# Patient Record
Sex: FEMALE | Race: WHITE | NOT HISPANIC OR LATINO | Employment: FULL TIME | URBAN - METROPOLITAN AREA
[De-identification: names, ages, dates, MRNs, and addresses within clinical notes are randomized per-mention and may not be internally consistent; named-entity substitution may affect disease eponyms.]

---

## 2023-05-10 ENCOUNTER — HOSPITAL ENCOUNTER (OUTPATIENT)
Dept: DATA CONVERSION | Facility: HOSPITAL | Age: 63
End: 2023-05-10
Attending: ORTHOPAEDIC SURGERY | Admitting: ORTHOPAEDIC SURGERY
Payer: COMMERCIAL

## 2023-05-10 DIAGNOSIS — M18.11 UNILATERAL PRIMARY OSTEOARTHRITIS OF FIRST CARPOMETACARPAL JOINT, RIGHT HAND: ICD-10-CM

## 2023-05-10 DIAGNOSIS — E78.5 HYPERLIPIDEMIA, UNSPECIFIED: ICD-10-CM

## 2023-05-10 DIAGNOSIS — M65.311 TRIGGER THUMB, RIGHT THUMB: ICD-10-CM

## 2023-05-10 DIAGNOSIS — M19.031 PRIMARY OSTEOARTHRITIS, RIGHT WRIST: ICD-10-CM

## 2023-05-10 DIAGNOSIS — M24.541 CONTRACTURE, RIGHT HAND: ICD-10-CM

## 2023-07-18 LAB
ALANINE AMINOTRANSFERASE (SGPT) (U/L) IN SER/PLAS: 16 U/L (ref 7–45)
ALBUMIN (G/DL) IN SER/PLAS: 4.4 G/DL (ref 3.4–5)
ALKALINE PHOSPHATASE (U/L) IN SER/PLAS: 87 U/L (ref 33–136)
ANION GAP IN SER/PLAS: 12 MMOL/L (ref 10–20)
ASPARTATE AMINOTRANSFERASE (SGOT) (U/L) IN SER/PLAS: 18 U/L (ref 9–39)
BASOPHILS (10*3/UL) IN BLOOD BY AUTOMATED COUNT: 0.03 X10E9/L (ref 0–0.1)
BASOPHILS/100 LEUKOCYTES IN BLOOD BY AUTOMATED COUNT: 0.7 % (ref 0–2)
BILIRUBIN TOTAL (MG/DL) IN SER/PLAS: 0.6 MG/DL (ref 0–1.2)
CALCIDIOL (25 OH VITAMIN D3) (NG/ML) IN SER/PLAS: 62 NG/ML
CALCIUM (MG/DL) IN SER/PLAS: 9.8 MG/DL (ref 8.6–10.3)
CARBON DIOXIDE, TOTAL (MMOL/L) IN SER/PLAS: 30 MMOL/L (ref 21–32)
CHLORIDE (MMOL/L) IN SER/PLAS: 102 MMOL/L (ref 98–107)
CHOLESTEROL (MG/DL) IN SER/PLAS: 264 MG/DL (ref 0–199)
CHOLESTEROL IN HDL (MG/DL) IN SER/PLAS: 91.9 MG/DL
CHOLESTEROL/HDL RATIO: 2.9
COBALAMIN (VITAMIN B12) (PG/ML) IN SER/PLAS: 921 PG/ML (ref 211–911)
CREATININE (MG/DL) IN SER/PLAS: 0.86 MG/DL (ref 0.5–1.05)
EOSINOPHILS (10*3/UL) IN BLOOD BY AUTOMATED COUNT: 0.15 X10E9/L (ref 0–0.7)
EOSINOPHILS/100 LEUKOCYTES IN BLOOD BY AUTOMATED COUNT: 3.7 % (ref 0–6)
ERYTHROCYTE DISTRIBUTION WIDTH (RATIO) BY AUTOMATED COUNT: 14.7 % (ref 11.5–14.5)
ERYTHROCYTE MEAN CORPUSCULAR HEMOGLOBIN CONCENTRATION (G/DL) BY AUTOMATED: 30.9 G/DL (ref 32–36)
ERYTHROCYTE MEAN CORPUSCULAR VOLUME (FL) BY AUTOMATED COUNT: 91 FL (ref 80–100)
ERYTHROCYTES (10*6/UL) IN BLOOD BY AUTOMATED COUNT: 4.53 X10E12/L (ref 4–5.2)
GFR FEMALE: 77 ML/MIN/1.73M2
GLUCOSE (MG/DL) IN SER/PLAS: 89 MG/DL (ref 74–99)
HEMATOCRIT (%) IN BLOOD BY AUTOMATED COUNT: 41.4 % (ref 36–46)
HEMOGLOBIN (G/DL) IN BLOOD: 12.8 G/DL (ref 12–16)
IMMATURE GRANULOCYTES/100 LEUKOCYTES IN BLOOD BY AUTOMATED COUNT: 0.2 % (ref 0–0.9)
LDL: 157 MG/DL (ref 0–99)
LEUKOCYTES (10*3/UL) IN BLOOD BY AUTOMATED COUNT: 4 X10E9/L (ref 4.4–11.3)
LYMPHOCYTES (10*3/UL) IN BLOOD BY AUTOMATED COUNT: 1.17 X10E9/L (ref 1.2–4.8)
LYMPHOCYTES/100 LEUKOCYTES IN BLOOD BY AUTOMATED COUNT: 29 % (ref 13–44)
MONOCYTES (10*3/UL) IN BLOOD BY AUTOMATED COUNT: 0.38 X10E9/L (ref 0.1–1)
MONOCYTES/100 LEUKOCYTES IN BLOOD BY AUTOMATED COUNT: 9.4 % (ref 2–10)
NEUTROPHILS (10*3/UL) IN BLOOD BY AUTOMATED COUNT: 2.29 X10E9/L (ref 1.2–7.7)
NEUTROPHILS/100 LEUKOCYTES IN BLOOD BY AUTOMATED COUNT: 57 % (ref 40–80)
NRBC (PER 100 WBCS) BY AUTOMATED COUNT: 0 /100 WBC (ref 0–0)
PLATELETS (10*3/UL) IN BLOOD AUTOMATED COUNT: 235 X10E9/L (ref 150–450)
POTASSIUM (MMOL/L) IN SER/PLAS: 3.7 MMOL/L (ref 3.5–5.3)
PROTEIN TOTAL: 7.2 G/DL (ref 6.4–8.2)
SEDIMENTATION RATE, ERYTHROCYTE: 6 MM/H (ref 0–30)
SODIUM (MMOL/L) IN SER/PLAS: 140 MMOL/L (ref 136–145)
THYROTROPIN (MIU/L) IN SER/PLAS BY DETECTION LIMIT <= 0.05 MIU/L: 1.5 MIU/L (ref 0.44–3.98)
TRIGLYCERIDE (MG/DL) IN SER/PLAS: 75 MG/DL (ref 0–149)
UREA NITROGEN (MG/DL) IN SER/PLAS: 14 MG/DL (ref 6–23)
VLDL: 15 MG/DL (ref 0–40)

## 2023-08-18 ENCOUNTER — HOSPITAL ENCOUNTER (OUTPATIENT)
Dept: DATA CONVERSION | Facility: HOSPITAL | Age: 63
End: 2023-08-18
Attending: ANESTHESIOLOGY | Admitting: ANESTHESIOLOGY
Payer: COMMERCIAL

## 2023-08-18 DIAGNOSIS — M25.511 PAIN IN RIGHT SHOULDER: ICD-10-CM

## 2023-09-07 VITALS — HEIGHT: 68 IN | BODY MASS INDEX: 23.22 KG/M2 | WEIGHT: 153.22 LBS

## 2023-09-14 NOTE — H&P
History & Physical Reviewed:   I have reviewed the History and Physical dated:  17-Apr-2023   History and Physical reviewed and relevant findings noted. Patient examined to review pertinent physical  findings.: No significant changes   Home Medications Reviewed: no changes noted   Allergies Reviewed: no changes noted     Consent:   COVID-19 Consent:  ·  COVID-19 Risk Consent Surgeon has reviewed key risks related to the risk of marilu COVID-19 and if they contract COVID-19 what the risks are.       Electronic Signatures:  Maurilio Singh ()  (Signed 10-May-2023 10:57)   Authored: History & Physical Reviewed, Consent, Note  Completion      Last Updated: 10-May-2023 10:57 by Maurilio Singh ()

## 2023-09-29 VITALS — HEIGHT: 68 IN | BODY MASS INDEX: 22.79 KG/M2 | WEIGHT: 150.35 LBS

## 2023-10-02 NOTE — OP NOTE
Post Operative Note:     Post-Procedure Diagnosis: Right thumb CMC arthritis.   Right wrist STT arthritis.  Right trigger thumb.   Procedure: 1.   2.   3.   4.   5.   Surgeon: Maurilio Singh D.O.   Resident/Fellow/Other Assistant: URIAH Arita   Estimated Blood Loss (mL): Less than 10 cc   Specimen: no   Findings: Right thumb CMC arthritis.  Right wrist  STT arthritis.  Right trigger thumb.     Operative Report Dictated:  Dictation: not applicable - note contains Operative  Report   Operative Report:    Preoperative diagnosis: Right thumb CMC osteoarthritis.  Right hand first webspace contracture.Right trigger thumb.  Right wrist STT arthritis.    Postoperative diagnosis: Right thumb CMC osteoarthritis.  Right hand first webspace contracture.Right trigger thumb.  Right wrist STT arthritis.    Procedure planned: Right thumb CMC arthroplasty using Arthrex mini tight rope construct with possible tendon transfer.Right trigger thumb release.  Partial resection trapezoid for treatment of wrist STT arthritis.    Procedure performed: Right thumb CMC arthroplasty using Arthrex mini tight rope construct.  End to side tendon transfer of abductor pollicis longus to flexor carpi radialis for treatment of webspace contracture.Right trigger thumb release.  Partial  resection trapezoid for treatment right wrist STT arthritis.    Surgeon: Maurilio Singh D.O.    Assistant: URIAH Arita  The physician assistant was present to the entire case. Given the nature of the disease process and the procedure to be performed a skilled surgical assistant was necessary during the case. The assistant was necessary in order to hold retractors and directly  assist in the operation. A certified scrub tech was at the back table managing instruments and supplies for the surgical case.    Anesthesia: General    Estimated blood loss: Less than 20 mL    Drains: None    Tourniquet: Approximately 30 minutes at 250 mmHg to the well-padded  upper arm    Specimens: None    Implants: Arthrex mini tight rope with FiberWire suture and stainless steel endobuttons    Indications: The patient presented to the office with painful arthritis affecting right thumb CMC articulation.  The patient also had painful triggering of the right thumb and evidence for right wrist STT arthritis.  The patient described pain and dysfunction  on a daily basis.  The patient experienced failure of nonoperative treatment strategies.  After full discussion regarding risks benefits and alternatives the patient elected to proceed forth with surgery by way of right thumb CMC arthroplasty using Arthrex  mini tight rope construct possibly augmented with tendon transfer with trigger thumb release with partial resection trapezoid..  Informed consent was signed and placed in the chart.    Complications: None noted at the time of surgery    Description of operation: The patient was taken to the operative suite and placed in the supine position on the operating table.  A timeout was performed and the right hand was confirmed to be the operative site.  The patient was carefully positioned  on the table in such a fashion as to pad all bony prominences and peripheral nerves.  The patient was administered appropriate IV antibiotics and general anesthesia.  The patient was then prepped and draped in the normal sterile fashion.  After prepping  and draping an apex volar chevron shaped incision was marked out over the base of the thumb centered over the CMC articulation.  A 1 cm incision was marked out over the metaphyseal diaphyseal junction of the dorsal second metacarpal slightly ulnar to  midline.  A sterile Esmarch was then used to exsanguinate the upper extremity and tourniquet was raised to 250 mmHg. The 15 blade was then used to incise skin over the base of the thumb.  Tenotomy scissors were used to gently dissect down to the subcutaneous  plane, taking care to identify and protect the  dorsal sensory branches of the radial nerve.  The sensory branches were protected through the remainder of the case.  We then developed the interval between the extensor pollicis brevis and abductor pollicis  longus.  With the tendons retracted we created a full-thickness capsular incision down to bone directly overlying the CMC articulation.  Sharp dissection was used to release the soft tissue attachments to the trapezium.  An osteotome was then used to  quarter the trapezium and the trapezium fragments were then removed in a piecemeal fashion with the Rongeur.  Care was taken to avoid iatrogenic injury to the underlying FCR tendon and capsular structures.  The interface between the trapezoid and the  distal pole scaphoid was then inspected.  There was evidence for eburnated bone and cartilaginous destruction.  It was deemed most appropriate to resect the proximal portion of the trapezoid.  A 4 mm wafer was resected using osteotome and mallet and rongeur  eliminating the painful contact through this zone of wrist arthritis.   The guidepin from the Arthrex mini tight rope set was then placed about the radial base of the first metacarpal.  Under fluoroscopic imaging the pin was delivered from radial to ulnar  and proximal to distal obtaining bicortical purchase within the first metacarpal base and second metacarpal shaft.  Care was taken to pass the pin so that it would exit about the ulnar aspect of the second metacarpal shaft.  We then loaded the suture  for the definitive implant through the wire loop of the trailing edge of the guidepin.  The guidepin was then advanced along with the definitive suture construct through a small incision in the dorsal hand.  Dissection was carried down to the second metacarpal  shaft exposing a small window of periosteum over the dorsal ulnar aspect of the second metacarpal shaft.  The second stainless steel endo-button was then tied down over the ulnar aspect of the second  metacarpal shaft taking care to set the appropriate  tone within the construct.  Fluoroscopic imaging, dynamic assessment and direct inspection were used to set the appropriate tone within the construct avoiding overtightening or undertaking.  Despite the fact that the first metacarpal was positioned in  the appropriate posture upon tightening there was still evidence for adduction deformity of the metacarpal and webspace contracture.  It was deemed most appropriate to perform end to side transfer of abductor pollicis longus to flexor carpi radialis tendon  in order to impart a greater extension and abduction posture to the metacarpal which would lead to better function.  #2 Ethibond suture was used to create a suture suspension sling by passing suture to the FCR tendon just proximal to the point where it  passes volar to the second metacarpal and also through the abductor pollicis longus tendon at the point where it inserts at the radial aspect of the first metacarpal base, creating 2 passes through each tendon.  When tightened the suture suspension sling  improved the posture of the first metacarpal creating more abduction and extension copious irrigation was then performed.  The tourniquet was deflated.  Meticulous hemostasis was achieved.  Vital stitches were used to close the capsular layer over the  CMC articulation. Attention was then turned to the trigger thumb release.  A transverse incision was made in the MCP flexion crease to the right thumb.  Dissection was carried through the subcutaneous plane.  Neurovascular bundles were exposed both radially  and ulnarly.  These were protected.  The A1 pulley was exposed.  Combination of 15 blade and tenotomy scissors were used to release the A1 pulley in its midline.  Interrupted nylon stitches were used to close the skin.  The patient was then placed into  a nonadherent dressing and thumb spica short arm plaster splint.  The patient was then allowed to arise from  general anesthesia and taken to recovery in stable condition.  Overall the patient tolerated the procedure well.    Disposition: Stable to PACU        Electronic Signatures:  Maurilio Singh ()  (Signed 10-May-2023 12:31)   Authored: Post Operative Note, Note Completion      Last Updated: 10-May-2023 12:31 by Maurilio Singh ()

## 2023-10-03 ENCOUNTER — TREATMENT (OUTPATIENT)
Dept: OCCUPATIONAL THERAPY | Facility: CLINIC | Age: 63
End: 2023-10-03
Payer: COMMERCIAL

## 2023-10-03 DIAGNOSIS — M19.049 CMC ARTHRITIS: Primary | ICD-10-CM

## 2023-10-03 PROCEDURE — 97110 THERAPEUTIC EXERCISES: CPT | Mod: GO

## 2023-10-03 ASSESSMENT — PAIN SCALES - GENERAL: PAINLEVEL_OUTOF10: 5 - MODERATE PAIN

## 2023-10-03 ASSESSMENT — PAIN - FUNCTIONAL ASSESSMENT: PAIN_FUNCTIONAL_ASSESSMENT: 0-10

## 2023-10-03 NOTE — PROGRESS NOTES
Assessed    · CMC arthritis, thumb, degenerative (715.34) (M18.9)    Plan today.     Patient will report independence with returning to exercises and being able to hold grandchild., by week 10, goal partially met   ADL   Patient’s level of independence with ADLs/ IADLs will improve by at least 50% per the quick dash by discharge.       , by week 10, goal partially met   Orthosis: Patient will report follow through with wearing of orthosis as instructed by therapist.    , by week 10, goal partially met   HEP:   Patient will report daily follow with HEP recorded in daily treatment notes by discharge.        , by week 10, goal partially met   Scar:   Patient’s scar will be supple and demonstrate good healing that does not limit function.     , by week 5, goal met   Pain: Patient will report pain free use of hand for completing ADLs/IADLS.  , by week 5, goal met   Range of Motion/Joint Mobility:   Patient’s wrist ROM will be within normal limits for independence with ADLs/ IADLs by discharge.       , by week 10, goal partially met   Strength:    Patient's Gross Grasp strength (2nd setting) will be within 10 # of opposite hand for increased functional independence by discharge.       , by week 10   Motor Function/Control/Tone:   ROM, Patient will demonstrate functional  and pinch ROM in her digits., by week 10     Intervention plan include: hot pack , paraffin , edema control , education/instruction , home program , kinesiotaping , manual therapy , orthosis fabrication/fit training , therapeutic activities and therapeutic exercises.   Frequency and duration: 2 time(s) a week, for 5 weeks . Patient will benefit from 5 additional visits.   Potential to achieve rehab goals is good.     orthoses check .   Progress with POC, as tolerated.    Goals adjusted, see goals for details.    Monitor home program.    Patient instructed to call if problems.    Monitor orthotic wear and fit, adjust as needed.     Plan of care was  "developed with input and agreement by the patient.      Assessment      Patient was able to verbalized precautions and demonstrated/taught back good understanding of personalized exercise program as verbalized, demonstrated and/or provided in printed format.   Patient reporting decreased strength for  and pinch strength today.      Reason For Visit  Reason For Visit [facility]:   Continuation of therapy.      Adult Risk Screening    Initial Fall Risk Screening:   ALYCE has not fallen in the last 6 months. Her fall did not result in injury. ALYCE does not have a fear of falling. She does not need assistance with sitting, standing or walking. Does not need assistance walking in her home. She does not need assistance in an unfamiliar setting.   Pain Scale: On a scale of 0 to 10, the patient rates the pain at 5.   Pain Quality: aching.        Domestic Violence Screen: Does not feel threatened or abused physically, emotionally or sexually. Do you feel UNSAFE?yes   The patient feels safe in the home.        Insurance    Insurance reviewed   Visit number: 32/36        Subjective    Patient reports:.   Patient reports she is wearing her orthoses        Home program performing as directed: Yes.      Objective  No apparent edema noted right thumb.  Observation: (Patient tends to still keep thumb adducted and IP hyperextended)   Strength:   Hands   (Key: \" P! \" Denotes Pain with Movement)   Hand Dominance: Right    Strength: level II 13 lbs on the right and level II 43 lbs on the left.   The key pinch trials for the right 7 lbs and left 13 lbs.   3 Point Pinch: right 8 lbs and left 14 lbs.     Outcome Measures:   Quick Dash score: 75 last appointment      Treatment:  Gentle PROM completed to right thumb completed.  Patient completed stretching with Theraweb. She also completed  and pinch strengthening. Patient completed Theratube, weighted ball, and pinch clip.  Patient completed fine motor with short opponens " on.Reviewed home program.    Assessment: Patient tolerated all exercises well, but had difficulty completing fine motor with short opponens on to encourage thumb IP flexion. She is demonstrating decreased hand strength this date.    Plan: Continue with ROM and strengthening right thumb.

## 2023-10-06 PROBLEM — G56.11: Status: ACTIVE | Noted: 2023-10-06
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
## 2023-10-06 PROBLEM — M25.511 CHRONIC RIGHT SHOULDER PAIN: Status: ACTIVE | Noted: 2023-10-06

## 2023-10-06 PROBLEM — K59.09 CHRONIC CONSTIPATION: Status: ACTIVE | Noted: 2023-10-06

## 2023-10-06 PROBLEM — M19.019 ACROMIOCLAVICULAR JOINT ARTHRITIS: Status: ACTIVE | Noted: 2023-10-06

## 2023-10-06 PROBLEM — R25.1 TREMOR: Status: ACTIVE | Noted: 2023-10-06

## 2023-10-06 PROBLEM — M65.319 TRIGGER THUMB: Status: ACTIVE | Noted: 2023-10-06

## 2023-10-06 PROBLEM — I07.1 TRICUSPID REGURGITATION: Status: ACTIVE | Noted: 2023-10-06

## 2023-10-06 PROBLEM — R29.898 COGWHEEL RIGIDITY: Status: ACTIVE | Noted: 2023-10-06

## 2023-10-06 PROBLEM — E67.3 VITAMIN D INTOXICATION: Status: ACTIVE | Noted: 2023-10-06

## 2023-10-06 PROBLEM — R94.31 ABNORMAL ELECTROCARDIOGRAM (ECG) (EKG): Status: ACTIVE | Noted: 2023-10-06

## 2023-10-06 PROBLEM — M75.01 ADHESIVE CAPSULITIS OF RIGHT SHOULDER: Status: ACTIVE | Noted: 2023-10-06

## 2023-10-06 PROBLEM — E78.2 MIXED HYPERLIPIDEMIA: Status: ACTIVE | Noted: 2023-10-06

## 2023-10-06 PROBLEM — R01.1 MURMUR: Status: ACTIVE | Noted: 2023-10-06

## 2023-10-06 PROBLEM — R63.4 LOSS OF WEIGHT: Status: ACTIVE | Noted: 2023-10-06

## 2023-10-06 PROBLEM — E55.9 VITAMIN D DEFICIENCY: Status: ACTIVE | Noted: 2023-10-06

## 2023-10-06 PROBLEM — E66.3 OVERWEIGHT (BMI 25.0-29.9): Status: ACTIVE | Noted: 2023-10-06

## 2023-10-06 PROBLEM — G20.A1 PARKINSONS DISEASE (MULTI): Status: ACTIVE | Noted: 2023-10-06

## 2023-10-06 PROBLEM — N81.6 RECTOCELE: Status: ACTIVE | Noted: 2023-10-06

## 2023-10-06 PROBLEM — R94.30 ABNORMAL RESULTS OF CARDIOVASCULAR FUNCTION STUDIES: Status: ACTIVE | Noted: 2023-10-06

## 2023-10-06 PROBLEM — E28.39 HYPOESTROGENISM: Status: ACTIVE | Noted: 2023-10-06

## 2023-10-06 PROBLEM — M67.449 GANGLION OF HAND: Status: ACTIVE | Noted: 2023-10-06

## 2023-10-06 PROBLEM — R32 URINARY INCONTINENCE IN FEMALE: Status: ACTIVE | Noted: 2023-10-06

## 2023-10-06 PROBLEM — J31.0 CHRONIC RHINITIS: Status: ACTIVE | Noted: 2023-10-06

## 2023-10-06 PROBLEM — G89.29 CHRONIC RIGHT SHOULDER PAIN: Status: ACTIVE | Noted: 2023-10-06

## 2023-10-06 PROBLEM — M25.60 JOINT STIFFNESS: Status: ACTIVE | Noted: 2023-10-06

## 2023-10-06 RX ORDER — MONTELUKAST SODIUM 10 MG/1
1 TABLET ORAL DAILY
COMMUNITY

## 2023-10-06 RX ORDER — ACETAMINOPHEN 500 MG
1 TABLET ORAL DAILY
COMMUNITY
End: 2024-01-26 | Stop reason: WASHOUT

## 2023-10-06 RX ORDER — MULTIVIT,CALC,MINS/IRON/FOLIC 9MG-400MCG
1 TABLET ORAL DAILY
COMMUNITY
End: 2024-01-26 | Stop reason: WASHOUT

## 2023-10-06 RX ORDER — ZINC GLUCONATE 50 MG
1 TABLET ORAL DAILY
COMMUNITY

## 2023-10-06 RX ORDER — MAGNESIUM OXIDE/MAG AA CHELATE 300 MG
1 CAPSULE ORAL DAILY
COMMUNITY
End: 2024-01-26 | Stop reason: WASHOUT

## 2023-10-06 RX ORDER — HYDROCORTISONE 25 MG/G
OINTMENT TOPICAL 4 TIMES DAILY
COMMUNITY
End: 2024-01-26 | Stop reason: WASHOUT

## 2023-10-06 RX ORDER — MULTIVITAMIN WITH IRON
1 TABLET ORAL DAILY
COMMUNITY
End: 2024-01-26 | Stop reason: WASHOUT

## 2023-10-06 RX ORDER — ASCORBIC ACID 250 MG
1 TABLET ORAL DAILY
COMMUNITY
End: 2024-01-26 | Stop reason: WASHOUT

## 2023-10-06 RX ORDER — SODIUM, POTASSIUM,MAG SULFATES 17.5-3.13G
SOLUTION, RECONSTITUTED, ORAL ORAL
COMMUNITY

## 2023-10-06 RX ORDER — NAPROXEN 500 MG/1
1 TABLET ORAL 2 TIMES DAILY
COMMUNITY
Start: 2020-01-23 | End: 2024-01-26 | Stop reason: WASHOUT

## 2023-10-06 RX ORDER — SERTRALINE HYDROCHLORIDE 50 MG/1
50 TABLET, FILM COATED ORAL DAILY
COMMUNITY
Start: 2023-09-05 | End: 2024-01-26 | Stop reason: WASHOUT

## 2023-10-06 RX ORDER — LANOLIN ALCOHOL/MO/W.PET/CERES
1 CREAM (GRAM) TOPICAL DAILY
COMMUNITY
End: 2024-01-26 | Stop reason: WASHOUT

## 2023-10-06 RX ORDER — ALBUTEROL SULFATE 90 UG/1
1-2 AEROSOL, METERED RESPIRATORY (INHALATION)
COMMUNITY
End: 2024-01-26 | Stop reason: WASHOUT

## 2023-10-06 RX ORDER — UREA 200 MG/G
CREAM TOPICAL
COMMUNITY
Start: 2023-07-19 | End: 2024-01-26 | Stop reason: WASHOUT

## 2023-10-06 RX ORDER — ZINC SULFATE 50(220)MG
CAPSULE ORAL
COMMUNITY
End: 2024-01-26 | Stop reason: WASHOUT

## 2023-10-09 ENCOUNTER — OFFICE VISIT (OUTPATIENT)
Dept: ORTHOPEDIC SURGERY | Facility: CLINIC | Age: 63
End: 2023-10-09
Payer: COMMERCIAL

## 2023-10-09 DIAGNOSIS — M25.649 THUMB JOINT STIFFNESS: ICD-10-CM

## 2023-10-09 DIAGNOSIS — S56.019A RUPTURE OF FLEXOR POLLICIS LONGUS MUSCLE: ICD-10-CM

## 2023-10-09 DIAGNOSIS — M18.11 PRIMARY OSTEOARTHRITIS OF FIRST CARPOMETACARPAL JOINT OF RIGHT HAND: Primary | ICD-10-CM

## 2023-10-09 PROCEDURE — 99214 OFFICE O/P EST MOD 30 MIN: CPT | Performed by: ORTHOPAEDIC SURGERY

## 2023-10-09 PROCEDURE — 1036F TOBACCO NON-USER: CPT | Performed by: ORTHOPAEDIC SURGERY

## 2023-10-09 NOTE — LETTER
October 9, 2023     Patient: Beverly Sanon   YOB: 1960   Date of Visit: 10/9/2023       To Whom It May Concern:    It is my medical opinion that Beverly Sanon may return to work on 10/10/23 on a light duty status for the next 4 weeks.  At her next appointment, we will re-evaluate the work restrictions .    If you have any questions or concerns, please don't hesitate to call< [phone].         Sincerely,        Maurilio Singh, DO

## 2023-10-09 NOTE — PROGRESS NOTES
History present illness: Patient presents today for ongoing evaluation status post right thumb CMC arthroplasty using Arthrex mini tight rope construct with concomitant right trigger thumb release done 5/10/2023.  She continues to have difficulty with active flexion of the right thumb interphalangeal joint.  EMG nerve conduction study test showed no evidence for AIN dysfunction.        Physical examination:  General: Alert and oriented to person, place, and time.  No acute distress and breathing comfortably: Pleasant and cooperative with examination.  Extremities: Evaluation of the right upper extremity finds the patient had palpable radial artery at the wrist with brisk capillary refill to all digits.  Patient has intact sensation to axillary radial median and ulnar nerves.  There are no open wounds.  There are no signs of infection.  There is no evidence of lymphedema or lymphatic streaking.  The patient has supple compartments to right arm forearm and hand.  Patient demonstrates good strong distal and phalangeal joint flexion to index and long finger.  She is able to demonstrate active thumb IP flexion with passive thumb MCP extension and blocking at the level of P1.      Diagnostic studies:      Assessment: Status post right thumb CMC arthroplasty with weakness to thumb flexion      Plan: Treatment options were discussed.  EMG came back effectively as a normal study.  It is possible that she has some pattern of attritional rupture that could be identified on MRI.  We talked about options.  She is going to keep on with her massage therapy sessions and proceed forth with MRI of the wrist to follow the course of FPL tendon and get a look at the muscle belly as well.  We will see back when that study is completed to discuss findings and treatment options from there.  No x-rays necessary upon return.      Procedure:        Procedure:

## 2023-10-10 ENCOUNTER — TREATMENT (OUTPATIENT)
Dept: OCCUPATIONAL THERAPY | Facility: CLINIC | Age: 63
End: 2023-10-10
Payer: COMMERCIAL

## 2023-10-10 DIAGNOSIS — M19.049 CMC ARTHRITIS: Primary | ICD-10-CM

## 2023-10-10 PROCEDURE — 97110 THERAPEUTIC EXERCISES: CPT | Mod: GO

## 2023-10-10 ASSESSMENT — PAIN SCALES - GENERAL: PAINLEVEL_OUTOF10: 5 - MODERATE PAIN

## 2023-10-10 ASSESSMENT — PAIN - FUNCTIONAL ASSESSMENT: PAIN_FUNCTIONAL_ASSESSMENT: 0-10

## 2023-10-10 NOTE — PROGRESS NOTES
Diagnosis:   · CMC arthritis, thumb, degenerative (715.34) (M18.9)     Plan today.      Patient will report independence with returning to exercises and being able to hold grandchild., by week 10, Goal partially met   ADL   Patient’s level of independence with ADLs/ IADLs will improve by at least 50% per the quick dash by discharge.    by week 10, Goal Partially  Met    Orthosis: Patient will report follow through with wearing of orthosis as instructed by therapist by week 10, Goal Met    HEP:   Patient will report daily follow with HEP recorded in daily treatment notes by discharge by week 10, Goal  Met         Scar:   Patient’s scar will be supple and demonstrate good healing that does not limit function.  by week 5, Goal Met     Pain: Patient will report pain free use of hand for completing ADLs/IADLS. by week 5, Goal Met     Range of Motion/Joint Mobility:   Patient’s wrist ROM will be within normal limits for independence with ADLs/ IADLs by discharge. by week 10, goal partially met     Strength:    Patient's Gross Grasp strength (2nd setting) will be within 10 # of opposite hand for increased functional independence by discharge.     by week 10 Partially met    Motor Function/Control/Tone:   ROM, Patient will demonstrate functional  and pinch ROM in her digits., by week 10      Intervention plan include: hot pack , paraffin , edema control , education/instruction , home program , kinesiotaping , manual therapy , orthosis fabrication/fit training , therapeutic activities and therapeutic exercises.   Frequency and duration: 2 time(s) a week, for 5 weeks . Patient will benefit from 5 additional visits.   Potential to achieve rehab goals is good.      Patient discharged to home program  Patient instructed to call if problems.         Initial Plan of Care was developed with input and agreement by the patient.      Assessment   Patient has progressed nicely and is discharged to home program and is expected to  "make gains.     Patient was able to verbalized precautions and demonstrated/taught back good understanding of personalized exercise program as verbalized, demonstrated and/or provided in printed format.   Patient reporting decreased strength for  and pinch strength today.      Reason For Visit  Reason For Visit [facility]:   Continuation of therapy.      Adult Risk Screening     Initial Fall Risk Screening:   ALYCE has not fallen in the last 6 months. Her fall did not result in injury. ALYCE does not have a fear of falling. She does not need assistance with sitting, standing or walking. Does not need assistance walking in her home. She does not need assistance in an unfamiliar setting.   Pain Scale: On a scale of 0 to 10, the patient rates the pain at 5.   Pain Quality: sharp in right thumb       Domestic Violence Screen: Does not feel threatened or abused physically, emotionally or sexually. Do you feel UNSAFE?yes   The patient feels safe in the home.        Insurance     Insurance reviewed   Visit number: 33/36        Subjective     Patient reports:.   Patient reports she is ready for discharge to home program.          Home program performing as directed: Yes.      Objective  No apparent edema noted right thumb.  Observation: (Patient tends to still keep thumb adducted and IP hyperextended)    AROM  THUMB  MP 0/45  IP -50/15  Thumb opposition 7  Thumb radial abduction 50  Thumb bryant abduction 60    Composite flexion right hand to distal bryant crease (DPC)  Index 1.5  Middle 0.5  Ring touching palm  Small touching palm    Strength:   Hands   (Key: \" P! \" Denotes Pain with Movement)   Hand Dominance: Right    Strength: level II 13 lbs on the right and level II 45 lbs on the left.   The key pinch trials for the right 8 lbs and left 12 lbs.   3 Point Pinch: right 8 lbs and left 14 lbs.      Outcome Measures:   Quick Dash score: 52.27      Treatment:   Re-evaluation completed and discussed findings with " patient. Paraffin with stretch to digits and thumb completed. PROM to right thumb completed. Patient completed stretching with Theraweb, Theratube, weighted ball,  2# weight, fine motor with grooved pegboard, and pinch clip. Reviewed home program for discharge.     Assessment:  Over all patient has made gains, but still has decreased hand strength and decreased ROM.     Plan: Patient discharged. She will Continue with ROM and strengthening right thumb for home program.

## 2023-10-23 ENCOUNTER — ANCILLARY PROCEDURE (OUTPATIENT)
Dept: RADIOLOGY | Facility: CLINIC | Age: 63
End: 2023-10-23
Payer: COMMERCIAL

## 2023-10-23 DIAGNOSIS — S56.019A RUPTURE OF FLEXOR POLLICIS LONGUS MUSCLE: ICD-10-CM

## 2023-10-23 PROCEDURE — 73218 MRI UPPER EXTREMITY W/O DYE: CPT | Mod: RT

## 2023-10-23 PROCEDURE — 73218 MRI UPPER EXTREMITY W/O DYE: CPT | Mod: RIGHT SIDE | Performed by: RADIOLOGY

## 2023-10-26 ENCOUNTER — OFFICE VISIT (OUTPATIENT)
Dept: ORTHOPEDIC SURGERY | Facility: CLINIC | Age: 63
End: 2023-10-26
Payer: COMMERCIAL

## 2023-10-26 DIAGNOSIS — M19.049 CMC ARTHRITIS: Primary | ICD-10-CM

## 2023-10-26 PROCEDURE — 99214 OFFICE O/P EST MOD 30 MIN: CPT | Performed by: ORTHOPAEDIC SURGERY

## 2023-10-26 PROCEDURE — 1036F TOBACCO NON-USER: CPT | Performed by: ORTHOPAEDIC SURGERY

## 2023-10-26 PROCEDURE — 20600 DRAIN/INJ JOINT/BURSA W/O US: CPT | Performed by: ORTHOPAEDIC SURGERY

## 2023-10-26 RX ORDER — LIDOCAINE HYDROCHLORIDE 10 MG/ML
0.5 INJECTION INFILTRATION; PERINEURAL ONCE
Status: COMPLETED | OUTPATIENT
Start: 2023-10-26 | End: 2024-05-17

## 2023-10-26 NOTE — LETTER
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   She is making an appointment with the neurologist and will follow up with my office after that.  Her work restrictions will be re-evaluated at that time.    If you have any questions or concerns, please don't hesitate to call, [phone].         Sincerely,        Maurilio Singh, DO

## 2023-10-26 NOTE — PROGRESS NOTES
History present illness: Patient presents today for ongoing follow-up status post right thumb CMC arthroplasty done in May 2023.  She has had difficulties with motion recovery.  The patient has a baseline of Parkinson's disease.  She describes pain down to the base of the left thumb.  She had an EMG nerve conduction study test that came back within normal limits.  We got that study specifically to look for AIN palsy but that was not recognized.  Postoperatively she has had a tendency towards a hyperextension posture of the thumb and has difficulty recruiting the thumb into IP joint flexion.      Past medical history: The patient's past medical history, family history, social history, and review of systems were documented on the patient medical intake.  The updated data was reviewed in the electronic medical record.  History is negative except otherwise stated in history of present illness.        Physical examination:  General: Alert and oriented to person, place, and time.  No acute distress and breathing comfortably: Pleasant and cooperative with examination.  HEENT: Head is normocephalic and atraumatic.  Neck: Supple, no visible swelling.  Cardiovascular: No palpable tachycardia  Lungs: No audible wheezing or labored breathing  Abdomen: Nondistended.  Extremities: Evaluation of the bilateral upper extremities finds the patient had palpable radial artery at the wrists with brisk capillary refill to all digits.  Patient has intact sensation to axillary radial median and ulnar nerves.  There are no open wounds.  There are no signs of infection.  There is no evidence of lymphedema or lymphatic streaking.  The patient has supple compartments to bilateral arm forearm and hand.  Patient shows tenderness to left thumb CMC joint with positive basilar grind test.  The right hand demonstrates generalized intrinsic atrophy.  There is hyperextension posture to the right thumb that is difficult to passively correct.  The EPL  tendon seems to have excessive tone.  There is cogwheel rigidity to passive flexion extension forces across the right wrist.  Positive Tinel's over course of ulnar nerve to right elbow.  Patient was observed to walk up and down the hallway with no evidence for shuffling gait.      Radiology:      Assessment: Status post right thumb CMC arthroplasty with complex clinical picture of cogwheel rigidity intrinsic atrophy and hyperextension posture to the right thumb of unclear etiology.  Left thumb CMC arthritis.  Parkinson's disease with cogwheel rigidity affecting the right upper extremity.      Plan: Treatment options were discussed.  On the left she elects for intra-articular steroid injection to CMC joint.  On the right the patient is going to observe things and make an appointment to see her neurologist through the [facility] to see if the Parkinson's is complicating the right upper extremity.  At this point I cannot find a discrete etiology specific to peripheral nerve compression or other hand related pathology that would explain the entire circumstance.  We will see back after evaluation by the neuro team, hopefully they can help.        Procedure:  Left Thumb Carpometacarpal Joint Injection: It was explained to the patient that the risks of a steroid injection include but are not limited to infection, local skin irritation, skin atrophy, calcification, continued pain and discomfort, elevated blood sugar, burning, failure to relieve pain, and possible late infection. The patient verbalized good insight and verbalized consent for the injection. It was further explained that the postinjection discomfort can be alleviated with additional medications, ice, elevation, and rest over the first 24 hours, and that these modalities are recommended.    Using aseptic technique, a solution containing 0.5 cc of 5 mg of Kenalog and 0.5 mL of 1% lidocaine without epinephrine was injected intraarticularly to the left thumb  carpometacarpal joint. Digital palpation was used to localize the CMC articulation about the dorsal radial aspect of the joint. Gentle traction was then imparted to the thumb distally and a 25-gauge needle was advanced through the skin, subcutaneous tissue and capsule. The injection was then administered and the patient tolerated the injection well. A band-aid was then placed. It should be noted that ethyl chloride spray was used to make the injection delivery more comfortable for the patient.

## 2023-11-06 ENCOUNTER — TELEPHONE (OUTPATIENT)
Dept: PRIMARY CARE | Facility: CLINIC | Age: 63
End: 2023-11-06
Payer: COMMERCIAL

## 2023-11-06 RX ORDER — ESCITALOPRAM OXALATE 5 MG/1
5 TABLET ORAL DAILY
COMMUNITY
End: 2024-05-16 | Stop reason: WASHOUT

## 2023-11-06 RX ORDER — CARBIDOPA AND LEVODOPA 25; 100 MG/1; MG/1
1 TABLET, ORALLY DISINTEGRATING ORAL 4 TIMES DAILY
COMMUNITY

## 2023-11-20 ENCOUNTER — OFFICE VISIT (OUTPATIENT)
Dept: ORTHOPEDIC SURGERY | Facility: CLINIC | Age: 63
End: 2023-11-20
Payer: COMMERCIAL

## 2023-11-20 DIAGNOSIS — M19.049 HAND ARTHRITIS: Primary | ICD-10-CM

## 2023-11-20 PROCEDURE — 1036F TOBACCO NON-USER: CPT | Performed by: ORTHOPAEDIC SURGERY

## 2023-11-20 PROCEDURE — 99213 OFFICE O/P EST LOW 20 MIN: CPT | Performed by: ORTHOPAEDIC SURGERY

## 2023-11-20 NOTE — PROGRESS NOTES
History present illness: Patient presents today for ongoing evaluation status post right thumb CMC arthroplasty.  She has a baseline of Parkinson's.  She was recently started on medication for her Parkinson's.  That was about 2 weeks ago.  So far no changes.  She continues to have issues with the posture of the right thumb.  She describes dissatisfaction with the contracted first webspace and the persistent IP hyperextension.        Physical examination:  General: Alert and oriented to person, place, and time.  No acute distress and breathing comfortably: Pleasant and cooperative with examination.  Extremities: Evaluation of the right upper extremity finds the patient had palpable radial artery at the wrist with brisk capillary refill to all digits.  Patient has intact sensation to axillary radial median and ulnar nerves.  There are no open wounds.  There are no signs of infection.  There is no evidence of lymphedema or lymphatic streaking.  The patient has supple compartments to right arm forearm and hand.  First webspace contracture right thumb with hyperextension posture of the interphalangeal joint that only corrects to about 30 degrees flexion with passive motion indicating some degree of extension contracture.  Actively the FPL function is intact but only minimal and the MCP must be forced into hyper extension to allow for only a small amount of active IP flexion.      Diagnostic studies:      Assessment: Status post right thumb CMC arthroplasty with postural changes to the thumb of unclear etiology likely multifactorial coming from the baseline of arthritic change and possible with a component of hypertonicity related to Parkinson's      Plan: Treatment options were discussed.  Recommendations are made for simple observation.  The patient is trying various splinting techniques through therapy.  We will see back in 3 months for repeat clinical evaluation.  No x-rays necessary upon  return.      Procedure:        Procedure:

## 2023-11-20 NOTE — LETTER
November 20, 2023     Patient: Beverly Sanon   YOB: 1960   Date of Visit: 11/20/2023       To Whom It May Concern:    It is my medical opinion that Beverly Sanon may return to work on 11/27/23 with restrictions.  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  These restrictions will remain in place for the next three months, at which that time she has a follow up appointment with myself to reassess these restrictions .    If you have any questions or concerns, please don't hesitate to call, [phone].         Sincerely,        Maurilio Singh, DO

## 2023-11-27 ENCOUNTER — APPOINTMENT (OUTPATIENT)
Dept: ORTHOPEDIC SURGERY | Facility: CLINIC | Age: 63
End: 2023-11-27
Payer: COMMERCIAL

## 2023-12-06 NOTE — PROGRESS NOTES
Rheumatology Outpatient Follow Up Note    Subjective   Beverly Sanon is a 63 y.o. female presenting today for No chief complaint on file..    History of Presenting Problem:   Rheum history:  She has pain in her hands since 8/2020. She has left thumb trigger finger s/p cortisone injection which really helped. She declined surgery at that time and has seen multiple orthopedic specialists. she also has right thumb pain. She was told she has arthritis and needs surgery which she declined. She received her 2nd cortisone injection in 1/2023 in her right CMC and she was told she has a lot of scarring in her joint and couldn't inject the medicine.      Interval history: she is undergoing PT for the frozen right shoulder which is slightly improving. He thumb pain has improved but she has limited thumb ROM now.     Past Medical History:   Past Medical History:   Diagnosis Date    Encounter for supervision of normal pregnancy, unspecified, unspecified trimester     Number of pregnancies, currently pregnant    Personal history of pneumonia (recurrent)     History of pneumonia       Allergies:   Allergies   Allergen Reactions    Red Yeast Rice Swelling, Hives and Itching       Medications:   Current Outpatient Medications:     acetaminophen (TYLENOL ORAL), Take 1-2 tablets by mouth every 6 hours if needed., Disp: , Rfl:     albuterol (Ventolin HFA) 90 mcg/actuation inhaler, Inhale 1-2 puffs. EVERY 4-6 HOURS AS NEEDED AND AS DIRECTED., Disp: , Rfl:     ascorbic acid (Vitamin C) 250 mg tablet, Take 1 tablet (250 mg) by mouth once daily., Disp: , Rfl:     CALCIUM ORAL, Take 1 tablet by mouth once daily., Disp: , Rfl:     carbidopa-levodopa (Parcopa)  mg disintegrating tablet, Take 1 tablet by mouth 3 times a day., Disp: , Rfl:     cholecalciferol (Vitamin D3) 50 mcg (2,000 unit) capsule, Take 1 capsule (50 mcg) by mouth once daily., Disp: , Rfl:     cyanocobalamin (Vitamin B-12) 1,000 mcg tablet, Take 1 tablet (1,000 mcg) by  mouth once daily., Disp: , Rfl:     docosahexaenoic acid/epa (FISH OIL ORAL), Take 3 capsules by mouth once daily., Disp: , Rfl:     escitalopram (Lexapro) 5 mg tablet, Take 1 tablet (5 mg) by mouth once daily., Disp: , Rfl:     hydrocortisone 2.5 % ointment, 4 times a day.  APPLY GENTLY TO AFFECTED AREA, Disp: , Rfl:     lysine 600 mg tablet, Take 1 tablet (600 mg) by mouth once daily., Disp: , Rfl:     magnesium oxide 500 mg capsule, Take 1 capsule (500 mg) by mouth once daily., Disp: , Rfl:     magnesium oxide-Mg AA chelate (Magnesium, oxide/AA chelate,) 300 mg capsule, Take 1 capsule (300 mg) by mouth once daily., Disp: , Rfl:     montelukast (Singulair) 10 mg tablet, Take 1 tablet (10 mg) by mouth once daily., Disp: , Rfl:     multivitamin (Multiple Vitamins) tablet, Take 1 tablet by mouth once daily., Disp: , Rfl:     naproxen (Naprosyn) 500 mg tablet, Take 1 tablet (500 mg) by mouth twice a day., Disp: , Rfl:     sertraline (Zoloft) 50 mg tablet, Take 1 tablet (50 mg) by mouth once daily., Disp: , Rfl:     sodium,potassium,mag sulfates (Suprep) 17.5-3.13-1.6 gram recon soln solution, Take by mouth. Use as directed, Disp: , Rfl:     urea (Carmol) 20 % cream, Apply to the affected area on the right toe twice daily, Disp: , Rfl:     zinc sulfate (Zincate) 220 (50 Zn) MG capsule, Take by mouth., Disp: , Rfl:     Current Facility-Administered Medications:     lidocaine (Xylocaine) 10 mg/mL (1 %) injection 0.5 mL, 0.5 mL, injection, Once, Maurilio Singh DO    triamcinolone acetonide (Kenalog) injection 5 mg, 5 mg, intra-articular, Once, Maurilio Singh DO    Review of Systems:   Constitutional: Denies fever, chills   Eyes: Denies dry eyes, pain in the eyes   ENT: Denies dry mouth, loss of taste, sores in the mouth  Cardiovascular: Denies chest pain, palpitations   Respiratory: Denies shortness of breath   Gastrointestinal: Denies heartburn   Integumentary: Denies photosensitivity, rash or lesions, Raynaud's  "  Neurological: Denies any numbness or tingling    MSK: As per HPI.     All 10 review of systems have been reviewed and are negative for complaint except as noted in the HPI    Objective   Physical Examination:  There were no vitals filed for this visit.  Growth %ile SmartLinks can only be used for patients less than 20 years old.  Ht Readings from Last 1 Encounters:   10/23/23 1.727 m (5' 8\")     Wt Readings from Last 1 Encounters:   10/23/23 68 kg (150 lb)       General - NAD, sitting up in chair, well-groomed, pleasant, AAOx3  Head: Normocephalic, atraumatic  Eyes - PERRLA, EOMI. No conjunctiva injection.   Mouth/ENT - Moist oral and nasal mucosa. No facial rash.   Cardiovascular - Normal S1, S2. Regular rate and rhythm. No murmurs or rubs.  Lungs - Symmetric chest expansion. Clear to auscultation bilaterally.   Skin - No rashes or ulcers. Skin warm and dry. No erythema on bilateral cheeks.  Extremities - No edema, cyanosis ,or clubbing  Neurological - Alert and oriented x 3,  grossly intact. No focal deficit.  Musculoskeletal -  Shoulders: Full ROM, without pain, no swelling, warmth or tenderness.  Elbows: Full ROM, without pain, no swelling, warmth or tenderness.  Wrists: Full ROM, without pain, no swelling, warmth or tenderness.  MCP: No swelling, warmth or tenderness. Metacarpal squeeze negative  PIP: No swelling, warmth or tenderness.  DIP: No swelling, warmth or tenderness.  Hands : 5/5.    Sacroiliac joints: No local tenderness. LLOYD negative.   Hips: Full ROM.  No malalignment.  Knees:  Full ROM, without pain, no swelling, warmth or point tenderness.   Ankles: Full ROM, without pain, swelling, warmth or tenderness.  Toes:  Metatarsal squeeze negative  Cervical spine: No tenderness or limitation of movement  Lumbar spine: No tenderness or limitation of movement        Laboratory Testing:  {[facility] PED Lab Results List:68843}  ***    Imaging:  ***    Assessment/Plan   Beverly Sanon is a 63 y.o. female with " PMHx of Parkinsons disease, HLP and vitamin D deficiency who is presenting today for follow up:     ##CMC arthritis:She has recurrent pain in her hands worse at the thumb bases and now R>L. She failed cortisone injection of the R thumb due to excessive scarring.Labs from 2/2023: ESR/CRP/RP/anti CCP all negative. Hand/wrist xrays are consistent with OA mostly at 1st CMC. I have low suspicion for autoimmune joint disease like RA. She was referred to hand OT and s/p hand surgery in 5/2023 and is getting better     ##Right shoulder pain/adhesive capsulitis:  -Continue PT  -Shoulder MRI reviewed: moderate AC arthritis, intact rotator cuff  -Referred to pain management***        The assessment and plan, risk and benefits were discussed with the patient. All of the patients questions were answered and patient agrees to the plan.      Werner Newman MD  Clinical   Department of Rheumatology   [facility]

## 2023-12-08 ENCOUNTER — APPOINTMENT (OUTPATIENT)
Dept: GASTROENTEROLOGY | Facility: EXTERNAL LOCATION | Age: 63
End: 2023-12-08
Payer: COMMERCIAL

## 2023-12-11 ENCOUNTER — APPOINTMENT (OUTPATIENT)
Dept: RHEUMATOLOGY | Facility: CLINIC | Age: 63
End: 2023-12-11
Payer: COMMERCIAL

## 2023-12-15 ENCOUNTER — APPOINTMENT (OUTPATIENT)
Dept: GASTROENTEROLOGY | Facility: EXTERNAL LOCATION | Age: 63
End: 2023-12-15
Payer: COMMERCIAL

## 2023-12-28 ENCOUNTER — APPOINTMENT (OUTPATIENT)
Dept: PRIMARY CARE | Facility: CLINIC | Age: 63
End: 2023-12-28
Payer: COMMERCIAL

## 2024-01-15 ENCOUNTER — APPOINTMENT (OUTPATIENT)
Dept: GASTROENTEROLOGY | Facility: EXTERNAL LOCATION | Age: 64
End: 2024-01-15
Payer: COMMERCIAL

## 2024-01-22 ENCOUNTER — CLINICAL SUPPORT (OUTPATIENT)
Dept: ORTHOPEDIC SURGERY | Facility: CLINIC | Age: 64
End: 2024-01-22
Payer: COMMERCIAL

## 2024-01-22 ENCOUNTER — OFFICE VISIT (OUTPATIENT)
Dept: ORTHOPEDIC SURGERY | Facility: CLINIC | Age: 64
End: 2024-01-22
Payer: COMMERCIAL

## 2024-01-22 DIAGNOSIS — M19.049 CMC ARTHRITIS: ICD-10-CM

## 2024-01-22 PROCEDURE — 1036F TOBACCO NON-USER: CPT | Performed by: ORTHOPAEDIC SURGERY

## 2024-01-22 PROCEDURE — 73140 X-RAY EXAM OF FINGER(S): CPT | Mod: RIGHT SIDE | Performed by: ORTHOPAEDIC SURGERY

## 2024-01-22 PROCEDURE — 99213 OFFICE O/P EST LOW 20 MIN: CPT | Performed by: ORTHOPAEDIC SURGERY

## 2024-01-22 NOTE — LETTER
January 22, 2024     Patient: Beverly Sanon   YOB: 1960   Date of Visit: 1/22/2024       To Whom It May Concern:    It is my medical opinion that Beverly Sanon  is cleared to return to work in a modified duty status.  Beverly continues to experience difficulties with the hands postoperatively and in regards to her Parkinson's syndrome.  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    We recommend that this modified duty status of the shop duties only be extended for the next 4 months.    If you have any questions or concerns, please don't hesitate to call.         Sincerely,        Maurilio Singh,     CC: No Recipients

## 2024-01-22 NOTE — PROGRESS NOTES
History present illness: Patient presents today for ongoing evaluation of the right thumb.  She is status post right thumb CMC arthroplasty.  She had difficulty with postural change to the right thumb and difficulties with active thumb IP flexion.  She feels the thumb IP flexion is slightly improved.  Overall she is having more difficulties with function to the right hand globally secondary to the Parkinson's disease.  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  She also states that she is developing stiffness that seems to be complicating her Parkinson's sitting in a sedentary position for close to 6 hours a day.        Physical examination:  General: Alert and oriented to person, place, and time.  No acute distress and breathing comfortably: Pleasant and cooperative with examination.  Extremities: Evaluation of the right upper extremity finds the patient had palpable radial artery at the wrist with brisk capillary refill to all digits.  Patient has intact sensation to axillary radial median and ulnar nerves.  There are no open wounds.  There are no signs of infection.  There is no evidence of lymphedema or lymphatic streaking.  The patient has supple compartments to right arm forearm and hand.  Patient does show slightly improved right thumb IP flexion actively.  Generally speaking the right hand is stiff and seems to have difficulty with willful motion in terms of digital flexion and extension.  She does recruit index long ring and small fingers into a partial fist but it takes effort and is slow and somewhat intermittently rigid, cogwheel, with active flexion extension arc.      Diagnostic studies:      Assessment: Parkinson's disease affecting right hand.  Status post right thumb CMC arthroplasty.      Plan: Treatment options were discussed.  I think ultimately she needs duty modification in the workplace that would get her off the  "bus.  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  Being in a sedentary position in any form is back from her Parkinson's.  She needs to be mobile through the day and the safe way in the shop.  She understands her options.  She can follow-up with me on an as-needed basis for the right or left hand.      Procedure:        Procedure:    "

## 2024-01-23 ENCOUNTER — APPOINTMENT (OUTPATIENT)
Dept: ORTHOPEDIC SURGERY | Facility: CLINIC | Age: 64
End: 2024-01-23
Payer: COMMERCIAL

## 2024-01-24 ASSESSMENT — PROMIS GLOBAL HEALTH SCALE
CARRYOUT_SOCIAL_ACTIVITIES: VERY GOOD
RATE_GENERAL_HEALTH: GOOD
RATE_AVERAGE_FATIGUE: MILD
RATE_SOCIAL_SATISFACTION: GOOD
RATE_QUALITY_OF_LIFE: GOOD
RATE_PHYSICAL_HEALTH: GOOD
RATE_AVERAGE_PAIN: 2
RATE_MENTAL_HEALTH: VERY GOOD
CARRYOUT_PHYSICAL_ACTIVITIES: MOSTLY
EMOTIONAL_PROBLEMS: RARELY

## 2024-01-26 ENCOUNTER — OFFICE VISIT (OUTPATIENT)
Dept: PRIMARY CARE | Facility: CLINIC | Age: 64
End: 2024-01-26
Payer: COMMERCIAL

## 2024-01-26 VITALS
DIASTOLIC BLOOD PRESSURE: 81 MMHG | BODY MASS INDEX: 22.64 KG/M2 | SYSTOLIC BLOOD PRESSURE: 125 MMHG | WEIGHT: 149.36 LBS | HEART RATE: 80 BPM | HEIGHT: 68 IN

## 2024-01-26 DIAGNOSIS — R29.898 COGWHEEL RIGIDITY: ICD-10-CM

## 2024-01-26 DIAGNOSIS — M75.01 ADHESIVE CAPSULITIS OF RIGHT SHOULDER: ICD-10-CM

## 2024-01-26 DIAGNOSIS — R53.83 OTHER FATIGUE: ICD-10-CM

## 2024-01-26 DIAGNOSIS — G20.A1 PARKINSON'S DISEASE WITHOUT DYSKINESIA, UNSPECIFIED WHETHER MANIFESTATIONS FLUCTUATE (MULTI): ICD-10-CM

## 2024-01-26 DIAGNOSIS — E78.2 MIXED HYPERLIPIDEMIA: ICD-10-CM

## 2024-01-26 DIAGNOSIS — M19.049 CMC ARTHRITIS: Primary | ICD-10-CM

## 2024-01-26 DIAGNOSIS — M25.60 JOINT STIFFNESS: ICD-10-CM

## 2024-01-26 PROBLEM — M79.671 RIGHT FOOT PAIN: Status: ACTIVE | Noted: 2023-07-26

## 2024-01-26 PROBLEM — S56.019A: Status: ACTIVE | Noted: 2024-01-26

## 2024-01-26 PROBLEM — M25.471 EDEMA OF RIGHT ANKLE: Status: ACTIVE | Noted: 2023-07-26

## 2024-01-26 PROBLEM — G89.18 POSTOPERATIVE PAIN: Status: ACTIVE | Noted: 2024-01-26

## 2024-01-26 PROBLEM — R60.0 EDEMA OF RIGHT FOOT: Status: ACTIVE | Noted: 2023-07-26

## 2024-01-26 PROCEDURE — 1036F TOBACCO NON-USER: CPT | Performed by: INTERNAL MEDICINE

## 2024-01-26 PROCEDURE — 99214 OFFICE O/P EST MOD 30 MIN: CPT | Performed by: INTERNAL MEDICINE

## 2024-01-26 ASSESSMENT — PATIENT HEALTH QUESTIONNAIRE - PHQ9
2. FEELING DOWN, DEPRESSED OR HOPELESS: NOT AT ALL
1. LITTLE INTEREST OR PLEASURE IN DOING THINGS: NOT AT ALL
SUM OF ALL RESPONSES TO PHQ9 QUESTIONS 1 AND 2: 0

## 2024-01-26 NOTE — PROGRESS NOTES
Assessment/Plan   Problem List Items Addressed This Visit       CMC arthritis - Primary    Mixed hyperlipidemia    Parkinsons disease    Adhesive capsulitis of right shoulder    Cogwheel rigidity    Joint stiffness    Fatigue   No change in the treatment plan  Labs has been ordered by the [facility] physicians I will review once it is available which include CBC CMP and lipid profile  Following informed 4 months time  She was supposed to have a colonoscopy examination which was rescheduled and naturally that is also important for preventive purposes    Subjective   Patient ID: Beverly Sanon is a 64 y.o. female who presents for Annual Exam.    Past Surgical History:   Procedure Laterality Date    APPENDECTOMY  2014    Appendectomy    HAND SURGERY Right 2023    OTHER SURGICAL HISTORY  2021    Colonoscopy    OTHER SURGICAL HISTORY  2021    Rectal Surgery    TUBAL LIGATION  2014    Tubal Ligation      Family History   Problem Relation Name Age of Onset    Atrial fibrillation Mother Dian     Dementia Mother Dian     Breast cancer Mother Dian     Osteoporosis Mother Dian     Other (cardiac disorder) Father      Hyperlipidemia Father      Other (PTCA) Father      Skin cancer Brother      Heart attack Paternal Grandfather        Social History     Socioeconomic History    Marital status:      Spouse name: Not on file    Number of children: Not on file    Years of education: Not on file    Highest education level: Not on file   Occupational History    Not on file   Tobacco Use    Smoking status: Former     Packs/day: 0.25     Years: 1.00     Additional pack years: 0.00     Total pack years: 0.25     Types: Cigarettes     Quit date:      Years since quittin.0    Smokeless tobacco: Never   Substance and Sexual Activity    Alcohol use: Yes     Comment: Occasionally    Drug use: Never    Sexual activity: Never   Other Topics Concern    Not on file   Social History  "Narrative    Not on file     Social Determinants of Health     Financial Resource Strain: Not on file   Food Insecurity: Not on file   Transportation Needs: Not on file   Physical Activity: Not on file   Stress: Not on file   Social Connections: Not on file   Intimate Partner Violence: Not on file   Housing Stability: Not on file      Red yeast rice   Current Outpatient Medications   Medication Sig Dispense Refill    acetaminophen (TYLENOL ORAL) Take 1-2 tablets by mouth every 6 hours if needed.      carbidopa-levodopa (Parcopa)  mg disintegrating tablet Take 1 tablet by mouth 3 times a day.      escitalopram (Lexapro) 5 mg tablet Take 1 tablet (5 mg) by mouth once daily.      magnesium oxide 500 mg capsule Take 1 capsule (500 mg) by mouth once daily.      montelukast (Singulair) 10 mg tablet Take 1 tablet (10 mg) by mouth once daily.      sodium,potassium,mag sulfates (Suprep) 17.5-3.13-1.6 gram recon soln solution Take by mouth. Use as directed       Current Facility-Administered Medications   Medication Dose Route Frequency Provider Last Rate Last Admin    lidocaine (Xylocaine) 10 mg/mL (1 %) injection 0.5 mL  0.5 mL injection Once Maurilio Singh DO        triamcinolone acetonide (Kenalog) injection 5 mg  5 mg intra-articular Once Maurilio Singh DO          Vitals:    01/26/24 0819   BP: 125/81   BP Location: Left arm   Patient Position: Sitting   Pulse: 80   Weight: 67.7 kg (149 lb 5.8 oz)   Height: 1.727 m (5' 8\")      Problem List Items Addressed This Visit       CMC arthritis - Primary    Mixed hyperlipidemia    Parkinsons disease    Adhesive capsulitis of right shoulder    Cogwheel rigidity    Joint stiffness    Fatigue      No orders of the defined types were placed in this encounter.       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 " "past sometimes and helping in a bassinet  She may be looking for FPC soon  She continues to be followed in the clinic system for her Parkinson's disease and also rheumatology in the  system I have reviewed those charts  Her CMC arthroplasty with interdigital web fibrosis that has been surgically operated continue to be treated with therapeutic intervention and now she is little better but he still has support for that  She also has some problems in the bunion on the foot on the right side and has been dealt by the podiatrist and does give some discomfort but there has not been any pain  She also has adhesive capsulitis of the right shoulder and therapy has been the way forward  She has no chest pain no palpitation all other systemic inquiry is negative  Past medical history reviewed  Social and family history reviewed  Allergies and medications reviewed  Recent labs reviewed  Vital signs reviewed    PHYSICAL EXAM  Masked facies consistent with Parkinson's disease  Cogwheel rigidity  Very little tremor  Movement of the shoulder on the right side is causing some restriction due to adhesive capsulitis  Heart sounds regular chest is clear  Abdomen is soft nontender  Neuro awake and alert      No results found for: \"PR1\", \"BMPR1A\", \"CMPLAS\", \"GH4VYNZA\", \"KPSAT\"   Lab Results   Component Value Date    CHOL 264 (H) 07/18/2023    CHHDL 2.9 07/18/2023                "

## 2024-01-29 ENCOUNTER — APPOINTMENT (OUTPATIENT)
Dept: RHEUMATOLOGY | Facility: CLINIC | Age: 64
End: 2024-01-29
Payer: COMMERCIAL

## 2024-02-12 ENCOUNTER — APPOINTMENT (OUTPATIENT)
Dept: ORTHOPEDIC SURGERY | Facility: CLINIC | Age: 64
End: 2024-02-12
Payer: COMMERCIAL

## 2024-02-22 NOTE — PROGRESS NOTES
Rheumatology Outpatient Follow Up Note    Subjective   Beverly Sanon is a 64 y.o. female presenting today for Hand Pain.    History of Presenting Problem:   Rheum history:  She has pain in her hands since 8/2020. She has left thumb trigger finger s/p cortisone injection which really helped. She declined surgery at that time and has seen multiple orthopedic specialists. she also has right thumb pain. She was told she has arthritis and needs surgery which she declined. She received her 2nd cortisone injection in 1/2023 in her right CMC and she was told she has a lot of scarring in her joint and couldn't inject the medicine.      Interval history: she severe stiffness in her right hand and this is affecting her daily life activities. . He thumb pain has improved but she has limited thumb ROM now.     Past Medical History:   Past Medical History:   Diagnosis Date    Arthritis 10/1/20    Encounter for supervision of normal pregnancy, unspecified, unspecified trimester     Number of pregnancies, currently pregnant    Frozen shoulder 5/2023    Lumbosacral disc disease 11/2008    Personal history of pneumonia (recurrent)     History of pneumonia       Allergies:   Allergies   Allergen Reactions    Red Yeast Rice Swelling, Hives and Itching       Medications:   Current Outpatient Medications:     acetaminophen (TYLENOL ORAL), Take 1-2 tablets by mouth every 6 hours if needed., Disp: , Rfl:     carbidopa-levodopa (Parcopa)  mg disintegrating tablet, Take 1 tablet by mouth 3 times a day., Disp: , Rfl:     escitalopram (Lexapro) 5 mg tablet, Take 1 tablet (5 mg) by mouth once daily., Disp: , Rfl:     magnesium oxide 500 mg capsule, Take 1 capsule (500 mg) by mouth once daily., Disp: , Rfl:     montelukast (Singulair) 10 mg tablet, Take 1 tablet (10 mg) by mouth once daily., Disp: , Rfl:     sodium,potassium,mag sulfates (Suprep) 17.5-3.13-1.6 gram recon soln solution, Take by mouth. Use as directed, Disp: , Rfl:  "    Current Facility-Administered Medications:     lidocaine (Xylocaine) 10 mg/mL (1 %) injection 0.5 mL, 0.5 mL, injection, Once, Maurilio Singh DO    triamcinolone acetonide (Kenalog) injection 5 mg, 5 mg, intra-articular, Once, Maurilio Singh DO    Review of Systems:   Constitutional: Denies fever, chills   Eyes: Denies dry eyes, pain in the eyes   ENT: Denies dry mouth, loss of taste, sores in the mouth  Cardiovascular: Denies chest pain, palpitations   Respiratory: Denies shortness of breath   Gastrointestinal: Denies heartburn   Integumentary: Denies photosensitivity, rash or lesions, Raynaud's   Neurological: Denies any numbness or tingling    MSK: As per HPI.     All 10 review of systems have been reviewed and are negative for complaint except as noted in the HPI    Objective   Physical Examination:  Vitals:    02/27/24 0942   BP: 127/82   Pulse: 89   Temp: 36.6 °C (97.8 °F)     Growth %ile SmartLinks can only be used for patients less than 20 years old.  Ht Readings from Last 1 Encounters:   02/27/24 1.727 m (5' 8\")     Wt Readings from Last 1 Encounters:   02/27/24 68 kg (150 lb)       General - NAD, sitting up in chair, well-groomed, pleasant, AAOx3  Head: Normocephalic, atraumatic  Eyes - PERRLA, EOMI. No conjunctiva injection.   Cardiovascular - Normal S1, S2. Regular rate and rhythm. No murmurs or rubs.  Lungs - Symmetric chest expansion. Clear to auscultation bilaterally.   Skin - No rashes or ulcers. Skin warm and dry. No erythema on bilateral cheeks.  Extremities - No edema, cyanosis ,or clubbing  Neurological - Alert and oriented x 3,  grossly intact. No focal deficit.  Wrists: Full ROM, without pain, no swelling, warmth or tenderness.  MCP: No swelling, warmth or tenderness. Metacarpal squeeze negative  PIP: No swelling, warmth or tenderness.  DIP: No swelling, warmth or tenderness.  Hands : 5/5.            Assessment/Plan   Beverly Sanon is a 64 y.o. female with PMHx of Parkinsons disease, " HLP and vitamin D deficiency who is presenting today for follow up:      ##CMC arthritis:She has recurrent pain in her hands worse at the thumb bases and now R>L. She failed cortisone injection of the R thumb due to excessive scarring.Labs from 2/2023: ESR/CRP/RP/anti CCP all negative. Hand/wrist xrays are consistent with OA mostly at 1st CMC. I have low suspicion for autoimmune joint disease like RA. She is s/p hand surgery in 5/2023.  -PT referral  -Consider Gabapentin/Avoid Cymbalta since she is on Carbidopa-Levodopa        The assessment and plan, risk and benefits were discussed with the patient. All of the patients questions were answered and patient agrees to the plan.      Werner Newman MD  Clinical   Department of Rheumatology   [facility]

## 2024-02-27 ENCOUNTER — OFFICE VISIT (OUTPATIENT)
Dept: RHEUMATOLOGY | Facility: CLINIC | Age: 64
End: 2024-02-27
Payer: COMMERCIAL

## 2024-02-27 VITALS
DIASTOLIC BLOOD PRESSURE: 82 MMHG | HEART RATE: 89 BPM | WEIGHT: 150 LBS | BODY MASS INDEX: 22.73 KG/M2 | SYSTOLIC BLOOD PRESSURE: 127 MMHG | TEMPERATURE: 97.8 F | HEIGHT: 68 IN

## 2024-02-27 DIAGNOSIS — M25.50 POLYARTHRALGIA: ICD-10-CM

## 2024-02-27 DIAGNOSIS — M19.049 CMC ARTHRITIS: Primary | ICD-10-CM

## 2024-02-27 PROCEDURE — 99214 OFFICE O/P EST MOD 30 MIN: CPT | Performed by: STUDENT IN AN ORGANIZED HEALTH CARE EDUCATION/TRAINING PROGRAM

## 2024-02-27 PROCEDURE — 1036F TOBACCO NON-USER: CPT | Performed by: STUDENT IN AN ORGANIZED HEALTH CARE EDUCATION/TRAINING PROGRAM

## 2024-03-12 ENCOUNTER — EVALUATION (OUTPATIENT)
Dept: OCCUPATIONAL THERAPY | Facility: CLINIC | Age: 64
End: 2024-03-12
Payer: COMMERCIAL

## 2024-03-12 DIAGNOSIS — M19.049 CMC ARTHRITIS: ICD-10-CM

## 2024-03-12 DIAGNOSIS — M25.641 DECREASED RANGE OF MOTION OF RIGHT THUMB: Primary | ICD-10-CM

## 2024-03-12 PROCEDURE — 97112 NEUROMUSCULAR REEDUCATION: CPT | Mod: GO

## 2024-03-12 PROCEDURE — 97165 OT EVAL LOW COMPLEX 30 MIN: CPT | Mod: GO

## 2024-03-12 ASSESSMENT — ENCOUNTER SYMPTOMS
LOSS OF SENSATION IN FEET: 0
OCCASIONAL FEELINGS OF UNSTEADINESS: 0
DEPRESSION: 0

## 2024-03-12 NOTE — PROGRESS NOTES
Therapy Communication Note    Patient Name: Beverly Sanon  MRN: [id number]  Today's Date: 3/12/2024     Goals:    Problem #1:  Decreased home exercise program knowledge  Goal #1:  The patient to demonstrate with 100% accuracy exercises to increase strength and prevent joint deformities and contractures throughout the right thumb hand.  Treatment Intervention:  Home exercise program education along with range-of-motion activities education.    Problem #2:  Increased Quick DASH score  Goal #2:  The patient to demonstrate an increase in right upper extremity function as indicated by decreased Quick DASH score ranging between 0-20% at the conclusion of all treatment sessions. Current Quick DASH score is 41.     Problem #3:  Need for splint.  Goal #3:  The patient to demonstrate with 100% accuracy: application and removal of right hand splint to address right thumb improper positioning and stiffness.   Treatment Intervention:  Splinting and splinting education.    Assessment:   This patient presents with a pre-surgical onset of this condition and has displayed appropriate coping abilities in dealing with the effects of this injury.  Standardized testing and measures administered today, including Quick Dash, reveal that the patient has minimal impairments in body structures and functions, activity limitations, and participation restrictions.  The Quick Dash was scored at 41.  The patient has a longstanding history of the present problem as is without any personal factors and/or comorbidities that impact the plan of care.  Two forms of identification were provided and she verbalized no complaints during the intake assessment of abuse or neglect.    Areas affected by this include limited muscle strength, decreased home task tolerance.  The patient's clinical presentation includes stable & uncomplicated characteristics as noted during today's evaluation, including pain, decreased motion and strength.   These  deficits are effecting her home abilities at various times during the day and may be considered as presenting with a condition that is stable & uncomplicated.   Treatment options vary for this client with various treatment protocols available.  Specific physician protocols and activities for a safe return to function will be utilized as available.  Time spent evaluating this client and providing treatment, evaluation and education 50 minutes.  The combination of these findings indicate that this patient has 2 performance deficits and is of low complexity, and skilled OT services are warranted in order to realize measurable change in the above outcome measures and achieve improvements in the patient's functional status and individual goals.      Plan of care was developed with input and agreement by the patient.    Plan of Care    Frequency and duration: time(s) a week . 1-2 times for splinting adjustments and serial positioning. Up 10 8 visits.     Plan of care was developed with input and agreement by the patient.     Client's primary Goal: Return to previous level of function and independence.   Reason For Visit    Initial Evaluation, Evaluation and Treatment.        Client Input    The patient's primary form of communication is English. There are no language barriers. Social interaction is appropriate with good interactive skills noted.    Difficulty With Movement, Self Care, Home Management,  Activity, ADL'S    Past Med/Surgical History: Reviewed  Current Medications  Please see patient medication and intake questionnaire for current medications.    Adult Risk Screening  There are no spiritual/cultural practices/values/needs that are important to know.  No apparent abuse or neglect is noted, no suicidal ideation or self-harm plans referenced.      Initial Fall Risk Screening:   The client has not fallen in the last 6 months. The client does not have a fear of falling. The client does not need assistance with  sitting, standing or walking. Does not need assistance walking in her home. The client does not need assistance in an unfamiliar setting. The client is not using an assistive device.    Fall Risk: none     Insurance  Insurance reviewed   Visit number:  1     Treatment    Time in clinic started at: 0915  Time in clinic ended at:  1006  18229 - OT Eval Low Complexity 30 min.  Timed: 36395 Neuromuscular reeducation, 20    Total time in clinic is minutes: 50         Subjective    Patient reports: My thumb doesn't move and needs to be stretched out.  I need a splint.    Objective  Neuro: Intact.       composite strength:  Right: Impaired  Left: WFL    Right Upper Extremity: CMC flexed 25 degrees. MP: 0/25, IP: -25/30 degrees.  SF: MP -20/50 slowed AROM of right hand.     Functional Task Tolerance:     Not Limited Progressing Painful Limited   Carrying    x   Gripping    x   Lifting    x   Manipulation    x   Pinching    x   Pulling    x   Pushing    x   Reaching    x   Weightbearing    x     Treatment Performed Today:  Information communicated to patient.   Education provided included home program   Home program: PROM , tendon gliding , AAROM , AROM , modalities available and possible usage, orthosis as needed, strengthening.       Jorge Luis TALAVERA/L, CHT, Date: 03/12/2014    1. Decreased range of motion of right thumb        2. CMC arthritis  Referral to Occupational Therapy

## 2024-03-20 ENCOUNTER — TREATMENT (OUTPATIENT)
Dept: OCCUPATIONAL THERAPY | Facility: CLINIC | Age: 64
End: 2024-03-20
Payer: COMMERCIAL

## 2024-03-20 DIAGNOSIS — M25.641 DECREASED RANGE OF MOTION OF RIGHT THUMB: ICD-10-CM

## 2024-03-20 DIAGNOSIS — M19.049 CMC ARTHRITIS: Primary | ICD-10-CM

## 2024-03-20 PROCEDURE — 97112 NEUROMUSCULAR REEDUCATION: CPT | Mod: GO

## 2024-03-20 NOTE — PROGRESS NOTES
Occupational Therapy                    Patient Name: Beverly Sanon  MRN: [id number]  Today's Date: 3/20/2024   Reason For Visit    Occupational therapy follow visit.      Client Input    Client's primary Goal: Have a better position of the right thumb.  Return to using hand better.     Adult Risk Screening  There are no spiritual/cultural practices/values/needs that are important to know   Initial Fall Risk Screening:   The client has not fallen in the last 6 months. The client has no fall injury. The client does not have a fear of falling. The client does not need assistance with sitting, standing or walking. The client does not need assistance walking in her home. The client does not need assistance in an unfamiliar setting. The patient is not using an assistive device.        Fall Risk: none     Insurance: Reviewed   Visit number: 2    Subjective: It said that I fell in the other report.  I haven't fallen.  Per report as as above the client is not a fall risk.     The patient's primary form of communication is English. There are no language barriers. Social interaction is appropriate with good interactive skills noted. Difficulty With Movement, Home Management, Work, ADL'S    Objective:     Interventions  Joint Mobilizations: (R] Thumb MP positioning and static serial Velcro splint.  Therapeutic Exercises Stretching, Patient Education:. Composite thumb slow load stretch to increase ABDduction position and CMC mobility.  Therapist instructed patient on home exercise program, patient verbalized understanding. Primary form of preferred communication utilized, English.     Assessment:     The client tolerated well.  Motion gains noted with stretching and positioning.  All therapeutic exercises, modalities and activities performed this date to reduce flexion position of thumb, increase hand function, increase independence in ADL and IADL tasks.    Plan:  Continue with occupational therapy progress with motion and  strengthening.  Progress towards ADL and work independence along with functional strengthening.    Time in clinic started at: 0820  Time in clinic ended at:  0905  Timed: 49903 Neuromuscular reeducation, 50    Total time in clinic is minutes: 45    SADAF Yuen/MARGUERITE, CHT

## 2024-03-27 ENCOUNTER — TREATMENT (OUTPATIENT)
Dept: OCCUPATIONAL THERAPY | Facility: CLINIC | Age: 64
End: 2024-03-27
Payer: COMMERCIAL

## 2024-03-27 DIAGNOSIS — M19.049 CMC ARTHRITIS: Primary | ICD-10-CM

## 2024-03-27 DIAGNOSIS — M25.641 DECREASED RANGE OF MOTION OF RIGHT THUMB: ICD-10-CM

## 2024-03-27 PROCEDURE — 97760 ORTHOTIC MGMT&TRAING 1ST ENC: CPT | Mod: GO

## 2024-03-27 NOTE — PROGRESS NOTES
Occupational Therapy                    Patient Name: Beverly Sanon  MRN: [id number]  Today's Date: 3/27/2024   Reason For Visit    Occupational therapy follow visit.      Client Input    Client's primary Goal: Have a better position of the right thumb.  Return to using hand better.     Adult Risk Screening  There are no spiritual/cultural practices/values/needs that are important to know   Initial Fall Risk Screening:   The client has not fallen in the last 6 months. The client has no fall injury. The client does not have a fear of falling. The client does not need assistance with sitting, standing or walking. The client does not need assistance walking in her home. The client does not need assistance in an unfamiliar setting. The patient is not using an assistive device.        Fall Risk: none     Insurance: Reviewed   Visit number: 3    Subjective: I think the splints feel and fit fine.    The patient's primary form of communication is English. There are no language barriers. Social interaction is appropriate with good interactive skills noted. Difficulty With Movement, Home Management, Work, ADL'S    Objective:     Interventions  Joint Mobilizations: (R] Thumb MP positioning, Polyflex II splints provided for the clients right thumb.  One long thumb spica, one short IP free thumb spica, and 1 web space stretching splint with previous serial splint incorporated into it.   The splints will be adjusted as needed as the thumb position increases towards radial deviation and pain level does not increase.  Composite thumb slow load stretch to continue as tolerated.  Therapist and client participated in the positioning and comfort of the splint. Primary form of preferred communication utilized, English.     Assessment:     The client tolerated well.  All therapeutic exercises, modalities and activities performed this date to reduce flexion position of thumb, increase hand function, increase independence in ADL and IADL  tasks.    Plan:  Continue with occupational therapy progress with splint adjustments new fabrication and serial changes to splints as needed.  Progress towards ADL and work independence along with functional strengthening.    Time in clinic started at: 0900  Time in clinic ended at:  1000  Timed: Orthotic fit and train, 24303  60 minutes    Total time in clinic is minutes: 60   Jorge Luis Dominguez OTR/MARGUERITE, CHT

## 2024-04-02 ENCOUNTER — APPOINTMENT (OUTPATIENT)
Dept: OCCUPATIONAL THERAPY | Facility: CLINIC | Age: 64
End: 2024-04-02
Payer: COMMERCIAL

## 2024-04-30 ENCOUNTER — OFFICE VISIT (OUTPATIENT)
Dept: PAIN MEDICINE | Facility: CLINIC | Age: 64
End: 2024-04-30
Payer: COMMERCIAL

## 2024-04-30 VITALS
HEART RATE: 90 BPM | SYSTOLIC BLOOD PRESSURE: 116 MMHG | DIASTOLIC BLOOD PRESSURE: 72 MMHG | RESPIRATION RATE: 16 BRPM | BODY MASS INDEX: 22.88 KG/M2 | WEIGHT: 151 LBS | TEMPERATURE: 99.1 F | OXYGEN SATURATION: 97 % | HEIGHT: 68 IN

## 2024-04-30 DIAGNOSIS — M75.01 ADHESIVE CAPSULITIS OF RIGHT SHOULDER: Primary | ICD-10-CM

## 2024-04-30 PROCEDURE — 99213 OFFICE O/P EST LOW 20 MIN: CPT | Performed by: ANESTHESIOLOGY

## 2024-04-30 PROCEDURE — 1036F TOBACCO NON-USER: CPT | Performed by: ANESTHESIOLOGY

## 2024-04-30 ASSESSMENT — LIFESTYLE VARIABLES
SKIP TO QUESTIONS 9-10: 1
HOW MANY STANDARD DRINKS CONTAINING ALCOHOL DO YOU HAVE ON A TYPICAL DAY: 1 OR 2
HOW OFTEN DO YOU HAVE A DRINK CONTAINING ALCOHOL: MONTHLY OR LESS
HOW OFTEN DO YOU HAVE SIX OR MORE DRINKS ON ONE OCCASION: NEVER
TOTAL SCORE: 0
AUDIT-C TOTAL SCORE: 1

## 2024-04-30 ASSESSMENT — ENCOUNTER SYMPTOMS
OCCASIONAL FEELINGS OF UNSTEADINESS: 0
LOSS OF SENSATION IN FEET: 0
EYE REDNESS: 0
SHORTNESS OF BREATH: 0
DEPRESSION: 0

## 2024-04-30 ASSESSMENT — PAIN SCALES - GENERAL: PAINLEVEL: 5

## 2024-04-30 ASSESSMENT — COLUMBIA-SUICIDE SEVERITY RATING SCALE - C-SSRS
1. IN THE PAST MONTH, HAVE YOU WISHED YOU WERE DEAD OR WISHED YOU COULD GO TO SLEEP AND NOT WAKE UP?: NO
2. HAVE YOU ACTUALLY HAD ANY THOUGHTS OF KILLING YOURSELF?: NO
6. HAVE YOU EVER DONE ANYTHING, STARTED TO DO ANYTHING, OR PREPARED TO DO ANYTHING TO END YOUR LIFE?: NO

## 2024-04-30 NOTE — H&P (VIEW-ONLY)
Chief Complain  Follow-up (5/10 right shoulder and upper arm, had injectio 8/18/2023)       History Of Present Illness  Beverly Sanon is a 64 y.o. female here for right shoulder pain. The patient rates the pain at 0  on a scale from 0-10.  The patient describes pain as aching.  The pain is worsened by  movement of the shoulder  and is alleviated by  staying still .  Since the last visit the pain has stayed the same.  The patient denies any fever, chills, weight loss, bladder/bowel incontinence.       Past Medical History  She has a past medical history of Arthritis (10/1/20), Encounter for supervision of normal pregnancy, unspecified, unspecified trimester ([facility]), Frozen shoulder (5/2023), Joint pain (2022), Lumbosacral disc disease (11/2008), Osteoarthritis (2023), and Personal history of pneumonia (recurrent).    Surgical History  She has a past surgical history that includes Appendectomy (03/24/2014); Tubal ligation (03/24/2014); Other surgical history (11/16/2021); Other surgical history (11/16/2021); Hand surgery (Right, 05/2023); Epidural block injection (1983); orthopedic surgery (2023); and Fracture surgery (1973).     Social History  She reports that she quit smoking about 14 years ago. Her smoking use included cigarettes. She started smoking about 15 years ago. She has a 0.3 pack-year smoking history. She has never used smokeless tobacco. She reports current alcohol use. She reports that she does not use drugs.    Family History  Family History   Problem Relation Name Age of Onset    Atrial fibrillation Mother Dian     Dementia Mother Dian     Breast cancer Mother Dian     Osteoporosis Mother Dian     Other (cardiac disorder) Father      Hyperlipidemia Father      Other (PTCA) Father      Skin cancer Brother      Heart attack Paternal Grandfather          Allergies  Red yeast rice    Review of Systems  Review of Systems   HENT:  Negative for ear pain.    Eyes:  Negative for redness.  "  Respiratory:  Negative for shortness of breath.    Cardiovascular:  Negative for chest pain.   Psychiatric/Behavioral:  Negative for suicidal ideas.         Physical Exam  Physical Exam  HENT:      Head: Normocephalic.   Eyes:      Extraocular Movements: Extraocular movements intact.      Pupils: Pupils are equal, round, and reactive to light.   Pulmonary:      Effort: Pulmonary effort is normal.   Neurological:      Mental Status: She is alert and oriented to person, place, and time.   Psychiatric:         Mood and Affect: Mood normal.           Last Recorded Vitals  Blood pressure 116/72, pulse 90, temperature 37.3 °C (99.1 °F), temperature source Temporal, resp. rate 16, height 1.727 m (5' 8\"), weight 68.5 kg (151 lb), SpO2 97%.       Assessment/Plan     Beverly Sanon is a 64 y.o. female here for follow-up of chronic right-sided shoulder pain and reduced range of motion.  She has previously been diagnosed with adhesive capsulitis.  She is status post right shoulder intra-articular injection which tremendously improved her pain and range of motion, since however she has plateaued.  She continues to engage in physical therapy and exercises to improve her range of motion.  On physical examination today she does have restricted range of motion, she is negative for signs of impingement.  I would recommend repeating intra-articular steroid injection in her left shoulder to get additional benefit.  Continue with home exercise as previous.          Sánchez Jean MD  "

## 2024-04-30 NOTE — PROGRESS NOTES
Chief Complain  Follow-up (5/10 right shoulder and upper arm, had injectio 8/18/2023)       History Of Present Illness  Beverly Sanon is a 64 y.o. female here for right shoulder pain. The patient rates the pain at 0  on a scale from 0-10.  The patient describes pain as aching.  The pain is worsened by  movement of the shoulder  and is alleviated by  staying still .  Since the last visit the pain has stayed the same.  The patient denies any fever, chills, weight loss, bladder/bowel incontinence.       Past Medical History  She has a past medical history of Arthritis (10/1/20), Encounter for supervision of normal pregnancy, unspecified, unspecified trimester ([facility]), Frozen shoulder (5/2023), Joint pain (2022), Lumbosacral disc disease (11/2008), Osteoarthritis (2023), and Personal history of pneumonia (recurrent).    Surgical History  She has a past surgical history that includes Appendectomy (03/24/2014); Tubal ligation (03/24/2014); Other surgical history (11/16/2021); Other surgical history (11/16/2021); Hand surgery (Right, 05/2023); Epidural block injection (1983); orthopedic surgery (2023); and Fracture surgery (1973).     Social History  She reports that she quit smoking about 14 years ago. Her smoking use included cigarettes. She started smoking about 15 years ago. She has a 0.3 pack-year smoking history. She has never used smokeless tobacco. She reports current alcohol use. She reports that she does not use drugs.    Family History  Family History   Problem Relation Name Age of Onset    Atrial fibrillation Mother Dian     Dementia Mother Dian     Breast cancer Mother Dian     Osteoporosis Mother Dian     Other (cardiac disorder) Father      Hyperlipidemia Father      Other (PTCA) Father      Skin cancer Brother      Heart attack Paternal Grandfather          Allergies  Red yeast rice    Review of Systems  Review of Systems   HENT:  Negative for ear pain.    Eyes:  Negative for redness.  "  Respiratory:  Negative for shortness of breath.    Cardiovascular:  Negative for chest pain.   Psychiatric/Behavioral:  Negative for suicidal ideas.         Physical Exam  Physical Exam  HENT:      Head: Normocephalic.   Eyes:      Extraocular Movements: Extraocular movements intact.      Pupils: Pupils are equal, round, and reactive to light.   Pulmonary:      Effort: Pulmonary effort is normal.   Neurological:      Mental Status: She is alert and oriented to person, place, and time.   Psychiatric:         Mood and Affect: Mood normal.           Last Recorded Vitals  Blood pressure 116/72, pulse 90, temperature 37.3 °C (99.1 °F), temperature source Temporal, resp. rate 16, height 1.727 m (5' 8\"), weight 68.5 kg (151 lb), SpO2 97%.       Assessment/Plan     Beverly Sanon is a 64 y.o. female here for follow-up of chronic right-sided shoulder pain and reduced range of motion.  She has previously been diagnosed with adhesive capsulitis.  She is status post right shoulder intra-articular injection which tremendously improved her pain and range of motion, since however she has plateaued.  She continues to engage in physical therapy and exercises to improve her range of motion.  On physical examination today she does have restricted range of motion, she is negative for signs of impingement.  I would recommend repeating intra-articular steroid injection in her left shoulder to get additional benefit.  Continue with home exercise as previous.          Sánchez Jean MD  "

## 2024-05-16 ENCOUNTER — OFFICE VISIT (OUTPATIENT)
Dept: PRIMARY CARE | Facility: CLINIC | Age: 64
End: 2024-05-16
Payer: COMMERCIAL

## 2024-05-16 VITALS
BODY MASS INDEX: 22.79 KG/M2 | WEIGHT: 150.4 LBS | HEART RATE: 82 BPM | TEMPERATURE: 97.2 F | HEIGHT: 68 IN | DIASTOLIC BLOOD PRESSURE: 81 MMHG | SYSTOLIC BLOOD PRESSURE: 126 MMHG

## 2024-05-16 DIAGNOSIS — G20.A1 PARKINSON'S DISEASE WITHOUT DYSKINESIA, UNSPECIFIED WHETHER MANIFESTATIONS FLUCTUATE (MULTI): ICD-10-CM

## 2024-05-16 DIAGNOSIS — M25.511 CHRONIC RIGHT SHOULDER PAIN: ICD-10-CM

## 2024-05-16 DIAGNOSIS — R29.898 COGWHEEL RIGIDITY: ICD-10-CM

## 2024-05-16 DIAGNOSIS — M62.541 ATROPHY OF MUSCLE OF RIGHT HAND: Primary | ICD-10-CM

## 2024-05-16 DIAGNOSIS — G89.29 CHRONIC RIGHT SHOULDER PAIN: ICD-10-CM

## 2024-05-16 DIAGNOSIS — R25.1 TREMOR: ICD-10-CM

## 2024-05-16 PROCEDURE — 99214 OFFICE O/P EST MOD 30 MIN: CPT | Performed by: INTERNAL MEDICINE

## 2024-05-16 PROCEDURE — 1036F TOBACCO NON-USER: CPT | Performed by: INTERNAL MEDICINE

## 2024-05-16 ASSESSMENT — PATIENT HEALTH QUESTIONNAIRE - PHQ9
1. LITTLE INTEREST OR PLEASURE IN DOING THINGS: NOT AT ALL
2. FEELING DOWN, DEPRESSED OR HOPELESS: NOT AT ALL
SUM OF ALL RESPONSES TO PHQ9 QUESTIONS 1 AND 2: 0

## 2024-05-16 NOTE — PROGRESS NOTES
Assessment/Plan   Problem List Items Addressed This Visit       Parkinsons disease (Multi)    Chronic right shoulder pain    Relevant Orders    Referral to Physical Therapy    Cogwheel rigidity    Relevant Orders    Referral to Physical Therapy    Tremor    Atrophy of muscle of right hand - Primary    Relevant Orders    Referral to Physical Therapy   Physical therapy referral was given  Discussed these conditions  Advised to continue to follow with the specialist  Follow-up with me 5 months    Subjective   Patient ID: Beverly Sanon is a 64 y.o. female who presents for Follow-up (4 month follow up.).    Past Surgical History:   Procedure Laterality Date    APPENDECTOMY  2014    Appendectomy    EPIDURAL BLOCK INJECTION  1983    FRACTURE SURGERY  1973    HAND SURGERY Right 2023    ORTHOPEDIC SURGERY      OTHER SURGICAL HISTORY  2021    Colonoscopy    OTHER SURGICAL HISTORY  2021    Rectal Surgery    TUBAL LIGATION  2014    Tubal Ligation    WISDOM TOOTH EXTRACTION        Family History   Problem Relation Name Age of Onset    Atrial fibrillation Mother Dian     Dementia Mother Dian     Breast cancer Mother Dian     Osteoporosis Mother Dian     Other (cardiac disorder) Father      Hyperlipidemia Father      Other (PTCA) Father      Skin cancer Brother      Heart attack Paternal Grandfather        Social History     Socioeconomic History    Marital status:      Spouse name: Not on file    Number of children: Not on file    Years of education: Not on file    Highest education level: Not on file   Occupational History    Not on file   Tobacco Use    Smoking status: Former     Current packs/day: 0.00     Average packs/day: 0.3 packs/day for 1.1 years (0.3 ttl pk-yrs)     Types: Cigarettes     Start date: 2009     Quit date: 2010     Years since quittin.3    Smokeless tobacco: Never   Substance and Sexual Activity    Alcohol use: Yes     Comment:  Occasionally    Drug use: Never    Sexual activity: Never   Other Topics Concern    Not on file   Social History Narrative    Not on file     Social Determinants of Health     Financial Resource Strain: Unknown (4/24/2024)    Received from [facility]    Overall Financial Resource Strain (CARDIA)     Difficulty of Paying Living Expenses: Patient refused   Food Insecurity: Unknown (4/24/2024)    [facility]    Hunger Vital Sign     Worried About Running Out of Food in the Last Year: Patient refused     Ran Out of Food in the Last Year: Patient refused   Transportation Needs: Unknown (4/24/2024)    [facility]    PRAPARE - Transportation     Lack of Transportation (Medical): Patient refused     Lack of Transportation (Non-Medical): Patient refused   Physical Activity: Unknown (4/24/2024)    Received from [facility]    Exercise Vital Sign     Days of Exercise per Week: Patient refused     Minutes of Exercise per Session: Patient refused   Recent Concern: Physical Activity - Insufficiently Active (2/3/2024)    Received from [facility]    Exercise Vital Sign     Days of Exercise per Week: 2 days     Minutes of Exercise per Session: 20 min   Stress: Unknown (4/24/2024)    Received from [facility]    [place] [employer] - Occupational Stress Questionnaire     Feeling of Stress : Patient refused   Recent Concern: Stress - Stress Concern Present (2/3/2024)    Received from [facility]    [place] [employer] - Occupational Stress Questionnaire     Feeling of Stress : To some extent   Social Connections: Unknown (4/24/2024)    [facility]    Social Connection and Isolation Panel [NHANES]     Frequency of Communication with Friends and Family: Patient refused     Frequency of Social Gatherings with Friends and Family: Patient refused     Attends Taoist Services: Patient refused     Active Member of Clubs or Organizations: Patient  refused     Attends Club or Organization Meetings: Patient refused     Marital Status: Patient refused   Intimate Partner Violence: Unknown (4/24/2024)    Received from [facility]    Humiliation, Afraid, Rape, and Kick questionnaire     Fear of Current or Ex-Partner: Patient refused     Emotionally Abused: Patient refused     Physically Abused: Patient refused     Sexually Abused: Patient refused   Housing Stability: Unknown (4/24/2024)    Received from [facility]    Housing Stability Vital Sign     Unable to Pay for Housing in the Last Year: Patient refused     Number of Places Lived in the Last Year: 1     Unstable Housing in the Last Year: No   Recent Concern: Housing Stability - High Risk (2/3/2024)    Received from [facility]    Housing Stability Vital Sign     Unable to Pay for Housing in the Last Year: No     Number of Places Lived in the Last Year: 22     Unstable Housing in the Last Year: Yes      Red yeast rice   Current Outpatient Medications   Medication Sig Dispense Refill    acetaminophen (TYLENOL ORAL) Take 1-2 tablets by mouth every 6 hours if needed.      carbidopa-levodopa (Parcopa)  mg disintegrating tablet Take 1 tablet by mouth 4 times a day.      magnesium oxide 500 mg capsule Take 1 capsule (500 mg) by mouth once daily.      montelukast (Singulair) 10 mg tablet Take 1 tablet (10 mg) by mouth once daily.      sodium,potassium,mag sulfates (Suprep) 17.5-3.13-1.6 gram recon soln solution Take by mouth. Use as directed       Current Facility-Administered Medications   Medication Dose Route Frequency Provider Last Rate Last Admin    lidocaine (Xylocaine) 10 mg/mL (1 %) injection 0.5 mL  0.5 mL injection Once Maurilio Singh DO        triamcinolone acetonide (Kenalog) injection 5 mg  5 mg intra-articular Once Maurilio Singh DO          Vitals:    05/16/24 1241   BP: 126/81   BP Location: Left arm   Patient Position: Sitting   Pulse: 82   Temp: 36.2 °C (97.2 °F)   Weight: 68.2 kg  "(150 lb 6.4 oz)   Height: 1.727 m (5' 8\")      Problem List Items Addressed This Visit       Parkinsons disease (Multi)    Chronic right shoulder pain    Relevant Orders    Referral to Physical Therapy    Cogwheel rigidity    Relevant Orders    Referral to Physical Therapy    Tremor    Atrophy of muscle of right hand - Primary    Relevant Orders    Referral to Physical Therapy      Orders Placed This Encounter   Procedures    Referral to Physical Therapy     Standing Status:   Future     Standing Expiration Date:   11/16/2024     Referral Priority:   Routine     Referral Type:   Consultation     Referral Reason:   Specialty Services Required     Requested Specialty:   Physical Therapy     Number of Visits Requested:   1        HPI  This 64-year-old very pleasant persons came to be seen and evaluated as a follow-up  The main request is a physical therapy for the ongoing right shoulder pain that she has seen a physician who has given a steroid injection and going to give another 1  She was diagnosed to have some adhesive capsulitis with decreased movement at the movement has improved with the use of a steroid  She had trigger finger and she had seen hand surgeon and subsequently there were carpal tunnel syndromes and there was atrophy of the muscles of the hand and she had a surgery on that hand and further intervention is already been followed up by the hand physician  She goes to neurologist for Parkinson's disease rigidity and tremor and she is on medications  She feels that physical therapy could help her in general specially the shoulder the right shoulder    ROS  No chest pain no palpitation no shortness of breath no nausea or vomiting  Labs that has been done by other physicians was reviewed  Cologuard test that was done before was negative  Other physicians notes were reviewed including rheumatologist note neurologist note and other physicians and hand surgeons  Past medical history reviewed  Social and family " "history reviewed  Allergies and medications reviewed  Recent labs reviewed  Vital signs reviewed    PHYSICAL EXAM  No peripheral dependent edema  No JVD  Mild tremor at rest  Cogwheel rigidity seems to be as before with no exacerbation  Heart sounds regular chest clear abdomen soft nontender neuro awake and alert      No results found for: \"PR1\", \"BMPR1A\", \"CMPLAS\", \"MK6GDOIX\", \"KPSAT\"   Lab Results   Component Value Date    CHOL 264 (H) 07/18/2023    CHHDL 2.9 07/18/2023                "

## 2024-05-17 ENCOUNTER — HOSPITAL ENCOUNTER (OUTPATIENT)
Dept: PAIN MEDICINE | Facility: CLINIC | Age: 64
Discharge: HOME | End: 2024-05-17
Payer: COMMERCIAL

## 2024-05-17 ENCOUNTER — HOSPITAL ENCOUNTER (OUTPATIENT)
Dept: RADIOLOGY | Facility: CLINIC | Age: 64
Discharge: HOME | End: 2024-05-17
Payer: COMMERCIAL

## 2024-05-17 VITALS
RESPIRATION RATE: 16 BRPM | HEART RATE: 91 BPM | OXYGEN SATURATION: 98 % | SYSTOLIC BLOOD PRESSURE: 117 MMHG | TEMPERATURE: 97.9 F | DIASTOLIC BLOOD PRESSURE: 70 MMHG

## 2024-05-17 DIAGNOSIS — M75.01 ADHESIVE CAPSULITIS OF RIGHT SHOULDER: ICD-10-CM

## 2024-05-17 PROCEDURE — 20610 DRAIN/INJ JOINT/BURSA W/O US: CPT | Performed by: ANESTHESIOLOGY

## 2024-05-17 PROCEDURE — 2500000005 HC RX 250 GENERAL PHARMACY W/O HCPCS: Performed by: ORTHOPAEDIC SURGERY

## 2024-05-17 PROCEDURE — 2500000004 HC RX 250 GENERAL PHARMACY W/ HCPCS (ALT 636 FOR OP/ED)

## 2024-05-17 PROCEDURE — 2500000005 HC RX 250 GENERAL PHARMACY W/O HCPCS

## 2024-05-17 PROCEDURE — 7100000009 HC PHASE TWO TIME - INITIAL BASE CHARGE

## 2024-05-17 PROCEDURE — 77002 NEEDLE LOCALIZATION BY XRAY: CPT | Performed by: ANESTHESIOLOGY

## 2024-05-17 PROCEDURE — 7100000010 HC PHASE TWO TIME - EACH INCREMENTAL 1 MINUTE

## 2024-05-17 RX ORDER — ROPIVACAINE HYDROCHLORIDE 5 MG/ML
INJECTION, SOLUTION EPIDURAL; INFILTRATION; PERINEURAL
Status: COMPLETED
Start: 2024-05-17 | End: 2024-05-17

## 2024-05-17 RX ORDER — TRIAMCINOLONE ACETONIDE 40 MG/ML
INJECTION, SUSPENSION INTRA-ARTICULAR; INTRAMUSCULAR
Status: COMPLETED
Start: 2024-05-17 | End: 2024-05-17

## 2024-05-17 RX ORDER — LIDOCAINE HYDROCHLORIDE 10 MG/ML
INJECTION, SOLUTION EPIDURAL; INFILTRATION; INTRACAUDAL; PERINEURAL
Status: COMPLETED
Start: 2024-05-17 | End: 2024-05-17

## 2024-05-17 RX ADMIN — LIDOCAINE HYDROCHLORIDE 50 MG: 10 INJECTION, SOLUTION EPIDURAL; INFILTRATION; INTRACAUDAL; PERINEURAL at 09:55

## 2024-05-17 RX ADMIN — TRIAMCINOLONE ACETONIDE 40 MG: 40 INJECTION, SUSPENSION INTRA-ARTICULAR; INTRAMUSCULAR at 09:48

## 2024-05-17 RX ADMIN — ROPIVACAINE HYDROCHLORIDE 100 MG: 5 INJECTION, SOLUTION EPIDURAL; INFILTRATION; PERINEURAL at 09:48

## 2024-05-17 RX ADMIN — LIDOCAINE HYDROCHLORIDE 0.5 ML: 10 INJECTION, SOLUTION EPIDURAL; INFILTRATION; INTRACAUDAL; PERINEURAL at 09:48

## 2024-05-17 SDOH — ECONOMIC STABILITY: FOOD INSECURITY: WITHIN THE PAST 12 MONTHS, YOU WORRIED THAT YOUR FOOD WOULD RUN OUT BEFORE YOU GOT MONEY TO BUY MORE.: NEVER TRUE

## 2024-05-17 SDOH — ECONOMIC STABILITY: FOOD INSECURITY: WITHIN THE PAST 12 MONTHS, THE FOOD YOU BOUGHT JUST DIDN'T LAST AND YOU DIDN'T HAVE MONEY TO GET MORE.: NEVER TRUE

## 2024-05-17 ASSESSMENT — PAIN DESCRIPTION - DESCRIPTORS: DESCRIPTORS: ACHING

## 2024-05-17 ASSESSMENT — LIFESTYLE VARIABLES
AUDIT-C TOTAL SCORE: 0
HOW OFTEN DO YOU HAVE A DRINK CONTAINING ALCOHOL: NEVER
SKIP TO QUESTIONS 9-10: 1
HOW MANY STANDARD DRINKS CONTAINING ALCOHOL DO YOU HAVE ON A TYPICAL DAY: PATIENT DOES NOT DRINK
HOW OFTEN DO YOU HAVE SIX OR MORE DRINKS ON ONE OCCASION: NEVER

## 2024-05-17 ASSESSMENT — ENCOUNTER SYMPTOMS
LOSS OF SENSATION IN FEET: 0
DEPRESSION: 0
OCCASIONAL FEELINGS OF UNSTEADINESS: 0

## 2024-05-17 ASSESSMENT — PAIN - FUNCTIONAL ASSESSMENT: PAIN_FUNCTIONAL_ASSESSMENT: 0-10

## 2024-05-17 ASSESSMENT — PAIN SCALES - GENERAL
PAINLEVEL_OUTOF10: 3
PAINLEVEL_OUTOF10: 0 - NO PAIN

## 2024-05-17 NOTE — OP NOTE
Procedure Note     Date: 2024  OR Location: [facility] NON-OR PROCEDURES    Name: Beverly Sanon, : 1960, Age: 64 y.o., MRN: [id number], Sex: female    Diagnosis  Preprocedure diagnosis: right shoulder pain  Postprocedure diagnosis: Same    Shoulder Injection    The patient was seen in the preoperative area. The risks, benefits, complications, treatment options, non-operative alternatives, expected recovery and outcomes were discussed with the patient. The patient concurred with the proposed plan, giving informed consent.            Procedure:  The patient was placed in the supine position where a sterile prep was done and drapes were applied. A 1.5 inch 25-gauge needle was advanced under fluoroscopic guidance towards upper medial quadrant of humeral head away from labrum.  After contact his os was made, and after negative aspiration 1 mL of Omnipaque was injected demonstrating the spread of the contrast in the right shoulder joint.  Then after negative aspiration a solution containing 40 mg of Kenalog and 4 mL of 0.5% ropivacaine was injected in to the right shoulder joint. Needle was then removed, a sterile bandage was applied over the puncture site. The patient tolerated the procedure without obvious complications and was discharged home.        Complications:  None; patient tolerated the procedure well.    Disposition: Home  Condition: stable         Additional Details: NA    Attending Attestation: I performed the procedure.    Sánchez Jean MD

## 2024-05-21 ENCOUNTER — EVALUATION (OUTPATIENT)
Dept: PHYSICAL THERAPY | Facility: CLINIC | Age: 64
End: 2024-05-21
Payer: COMMERCIAL

## 2024-05-21 DIAGNOSIS — M25.511 CHRONIC RIGHT SHOULDER PAIN: ICD-10-CM

## 2024-05-21 DIAGNOSIS — G89.29 CHRONIC RIGHT SHOULDER PAIN: ICD-10-CM

## 2024-05-21 DIAGNOSIS — M25.611 SHOULDER STIFFNESS, RIGHT: Primary | ICD-10-CM

## 2024-05-21 PROCEDURE — 97110 THERAPEUTIC EXERCISES: CPT | Mod: GP

## 2024-05-21 PROCEDURE — 97161 PT EVAL LOW COMPLEX 20 MIN: CPT | Mod: GP

## 2024-05-21 ASSESSMENT — ENCOUNTER SYMPTOMS
DEPRESSION: 0
LOSS OF SENSATION IN FEET: 0
OCCASIONAL FEELINGS OF UNSTEADINESS: 0

## 2024-05-21 NOTE — PROGRESS NOTES
Physical Therapy    Physical Therapy Evaluation and Treatment      Patient Name: Beverly Sanon  MRN: [id number]  Today's Date: 5/21/2024  Time Calculation  Start Time: 1056  Stop Time: 1128  Time Calculation (min): 32 min    Visit #1  10% coins, $300 ded ($0 met),. $9100 oop ($10 met), $15 copay, 40v parth yr (0v used as of 5/16/24), no PA     Assessment:    Pt presents with R shoulder pain, along with impairments of R shoulder mobility deficits and weakness. Patient would benefit from PT services to address current impairments and facilitate improvement in current activity limits. Educated patient on current POC, initial HEP and current examination findings. Patient verbalized understanding of all education and instruction provided today.     Plan:  OP PT Plan  Treatment/Interventions: Dry needling, Cryotherapy, Education/ Instruction, Gait training, Manual therapy, Neuromuscular re-education, Self care/ home management, Taping techniques, Therapeutic activities, Therapeutic exercises, Vasopneumatic device  PT Plan: Skilled PT  PT Frequency: 1 time per week  Duration: 20  Certification Period Start Date: 05/21/24  Certification Period End Date: 08/19/24  Number of Treatments Authorized: 40 ($15 copay)  Rehab Potential: Good  Plan of Care Agreement: Patient    Current Problem:   1. Shoulder stiffness, right  Follow Up In Physical Therapy      2. Chronic right shoulder pain  Referral to Physical Therapy    Follow Up In Physical Therapy          Subjective    General:   Pt presents with c/o R shoulder pain and stiffness. Patient has PMHx of Parkinsons and R shoulder adhesive capsulitis along with surgical history of R CMC trigger finger. Patient received an injection to her R shoulder last week and has been referred to PT services. Pt denies any falls or STACEY but suspects R UE immobility after undergoing hand surgery and a risk factor for her symptoms. Patient denies any numbness or tingling.     Pain:  1/10 at end-range  movement of R shoulder        Objective   Shoulder Musculoskeletal Exam    Range of Motion    Right      Active ROM: abnormal and pain.       Active forward elevation: 140.       Passive forward elevation: 160.       Shoulder active abduction: 100.       Passive abduction: 110.       Active external rotation at side: 30.       Passive external rotation at side: 30.       Active external rotation in abduction: 20.       Passive external rotation in abduction: 20.       Active internal rotation in abduction: 20.       Passive internal rotation in abduction: 20.       Internal rotation: L5.     Left      Active ROM: normal.       Internal rotation: L3.     Strength    Right      External rotation: 4-/5.       Internal rotation: 4-/5.       Abduction: 4-/5.       Biceps: 4-/5.       Triceps: 4-/5.     Left      External rotation: 4-/5.       Internal rotation: 4-/5.       Abduction: 4-/5.       Biceps: 4-/5.       Triceps: 4-/5.     Special Tests    Right    Rotator Cuff Signs      External rotation lag sign: negative       Supraspinatus: negative       Painful arc test: negative       Lift-off sign: negative     Biceps/lorene Signs      Clicking/popping: negative     AC Joint Signs      Active horizontal adduction pain: negative        Outcome Measures:  Other Measures  Disability of Arm Shoulder Hand (DASH): 55%     Treatments:  Cane AAROM in supine:  -45 deg abd ER x20  -80 deg abd ER x20  -Horizontal Abd x20  -Flexion x20  Cane AAROM standing:  -Extension x20  -IR x20  Wall Pec stretch oscillations x10    EDUCATION:  Outpatient Education  Individual(s) Educated: Patient  Education Provided: Anatomy, Body Mechanics, Home Exercise Program, Fall Risk, Home Safety, Physiology, POC    Goals:  Patient will improve R shoulder flexion strength to >/=4+/5 for improved overhead lifting performance.    Patient will improve R shoulder abduction strength to >/=4+/5 for improved overhead lifting.    Patient will improve R shoulder  ER strength to >/=4+/5 for improved shoulder stability with ADL performance.    Patient will improve R shoulder IR strength to >/=4+/5 for improved shoulder stability with ADL performance.    Patient will improve R shoulder flexion AROM to >/=0-170 deg for improved overhead shoulder mobility with reaching.    Patient will improve R shoulder abduction AROM to >/=0-160 deg for improved lateral shoulder mobility.    Patient will improve QUICKDASH score to </=20%    Patient will be independent with HEP.    Patient will report 0/10 shoulder pain with ADL performance.

## 2024-05-29 ENCOUNTER — APPOINTMENT (OUTPATIENT)
Dept: PHYSICAL THERAPY | Facility: CLINIC | Age: 64
End: 2024-05-29
Payer: COMMERCIAL

## 2024-06-04 ENCOUNTER — APPOINTMENT (OUTPATIENT)
Dept: PHYSICAL THERAPY | Facility: CLINIC | Age: 64
End: 2024-06-04
Payer: COMMERCIAL

## 2024-06-06 ENCOUNTER — APPOINTMENT (OUTPATIENT)
Dept: PRIMARY CARE | Facility: CLINIC | Age: 64
End: 2024-06-06
Payer: COMMERCIAL

## 2024-06-17 ENCOUNTER — APPOINTMENT (OUTPATIENT)
Dept: PHYSICAL THERAPY | Facility: CLINIC | Age: 64
End: 2024-06-17
Payer: COMMERCIAL

## 2024-06-24 ENCOUNTER — APPOINTMENT (OUTPATIENT)
Dept: PAIN MEDICINE | Facility: CLINIC | Age: 64
End: 2024-06-24
Payer: COMMERCIAL

## 2024-06-24 VITALS
HEART RATE: 85 BPM | TEMPERATURE: 97.2 F | OXYGEN SATURATION: 98 % | RESPIRATION RATE: 18 BRPM | DIASTOLIC BLOOD PRESSURE: 62 MMHG | HEIGHT: 68 IN | WEIGHT: 145 LBS | SYSTOLIC BLOOD PRESSURE: 119 MMHG | BODY MASS INDEX: 21.98 KG/M2

## 2024-06-24 DIAGNOSIS — M75.01 ADHESIVE CAPSULITIS OF RIGHT SHOULDER: Primary | ICD-10-CM

## 2024-06-24 PROCEDURE — 1036F TOBACCO NON-USER: CPT | Performed by: ANESTHESIOLOGY

## 2024-06-24 PROCEDURE — 99212 OFFICE O/P EST SF 10 MIN: CPT | Performed by: ANESTHESIOLOGY

## 2024-06-24 SDOH — ECONOMIC STABILITY: FOOD INSECURITY: WITHIN THE PAST 12 MONTHS, YOU WORRIED THAT YOUR FOOD WOULD RUN OUT BEFORE YOU GOT MONEY TO BUY MORE.: NEVER TRUE

## 2024-06-24 SDOH — ECONOMIC STABILITY: FOOD INSECURITY: WITHIN THE PAST 12 MONTHS, THE FOOD YOU BOUGHT JUST DIDN'T LAST AND YOU DIDN'T HAVE MONEY TO GET MORE.: NEVER TRUE

## 2024-06-24 ASSESSMENT — ENCOUNTER SYMPTOMS
SHORTNESS OF BREATH: 0
ARTHRALGIAS: 1
EYE REDNESS: 0
DEPRESSION: 0
OCCASIONAL FEELINGS OF UNSTEADINESS: 0
BLOOD IN STOOL: 0
LOSS OF SENSATION IN FEET: 0

## 2024-06-24 ASSESSMENT — COLUMBIA-SUICIDE SEVERITY RATING SCALE - C-SSRS
2. HAVE YOU ACTUALLY HAD ANY THOUGHTS OF KILLING YOURSELF?: NO
6. HAVE YOU EVER DONE ANYTHING, STARTED TO DO ANYTHING, OR PREPARED TO DO ANYTHING TO END YOUR LIFE?: NO
1. IN THE PAST MONTH, HAVE YOU WISHED YOU WERE DEAD OR WISHED YOU COULD GO TO SLEEP AND NOT WAKE UP?: NO

## 2024-06-24 ASSESSMENT — LIFESTYLE VARIABLES
HOW OFTEN DO YOU HAVE A DRINK CONTAINING ALCOHOL: NEVER
SKIP TO QUESTIONS 9-10: 1
HOW OFTEN DO YOU HAVE SIX OR MORE DRINKS ON ONE OCCASION: NEVER
HOW MANY STANDARD DRINKS CONTAINING ALCOHOL DO YOU HAVE ON A TYPICAL DAY: PATIENT DOES NOT DRINK
AUDIT-C TOTAL SCORE: 0

## 2024-06-24 ASSESSMENT — PAIN - FUNCTIONAL ASSESSMENT: PAIN_FUNCTIONAL_ASSESSMENT: NO/DENIES PAIN

## 2024-06-24 ASSESSMENT — PAIN SCALES - GENERAL: PAINLEVEL: 0-NO PAIN

## 2024-06-24 NOTE — PROGRESS NOTES
Chief Complain  Follow-up (For right shoulder injection on 5/17 with 99.9 % relief and is still lasting.)       History Of Present Illness  Beverly Sanon is a 64 y.o. female here for right shoulder pain. The patient rates the pain at 0  on a scale from 0-10.  The patient describes pain as aching.  The pain is worsened by no aggravating factors identified and is alleviated by cortisone injections.  Since the last visit the pain has improved.  The patient denies any fever, chills, weight loss, bladder/bowel incontinence.     Previous Procedures:  Intra-articular right shoulder injection:     Past Medical History  She has a past medical history of Allergic (After 50), Arthritis (10/1/20), Encounter for supervision of normal pregnancy, unspecified, unspecified trimester ([facility]), Frozen shoulder (5/2023), Joint pain (2022), Lumbosacral disc disease (11/2008), Osteoarthritis (2023), and Personal history of pneumonia (recurrent).    Surgical History  She has a past surgical history that includes Appendectomy (03/24/2014); Tubal ligation (03/24/2014); Other surgical history (11/16/2021); Other surgical history (11/16/2021); Hand surgery (Right, 05/2023); Epidural block injection (1983); orthopedic surgery (2023); Fracture surgery (1973); and [place] tooth extraction (1977).     Social History  She reports that she quit smoking about 14 years ago. Her smoking use included cigarettes. She started smoking about 15 years ago. She has a 0.3 pack-year smoking history. She has never used smokeless tobacco. She reports current alcohol use. She reports that she does not use drugs.    Family History  Family History   Problem Relation Name Age of Onset    Atrial fibrillation Mother Dian     Dementia Mother Dian     Breast cancer Mother Dian     Osteoporosis Mother Dian     Other (cardiac disorder) Father      Hyperlipidemia Father      Other (PTCA) Father      Skin cancer Brother      Heart attack Paternal  "Grandfather          Allergies  Chlorhexidine and Red yeast rice    Review of Systems  Review of Systems   HENT:  Negative for ear pain.    Eyes:  Negative for redness.   Respiratory:  Negative for shortness of breath.    Cardiovascular:  Negative for chest pain.   Gastrointestinal:  Negative for blood in stool.   Musculoskeletal:  Positive for arthralgias.   Psychiatric/Behavioral:  Negative for suicidal ideas.         Physical Exam  Physical Exam  HENT:      Head: Normocephalic.   Eyes:      Extraocular Movements: Extraocular movements intact.      Pupils: Pupils are equal, round, and reactive to light.   Pulmonary:      Effort: Pulmonary effort is normal.   Neurological:      Mental Status: She is alert and oriented to person, place, and time.   Psychiatric:         Mood and Affect: Mood normal.           Last Recorded Vitals  Blood pressure 119/62, pulse 85, temperature 36.2 °C (97.2 °F), resp. rate 18, height 1.727 m (5' 8\"), weight 65.8 kg (145 lb), SpO2 98%.       Assessment/Plan     Beverly Sanon is a 64 y.o. female here for follow-up of chronic right shoulder pain and reduced range of motion.  She has been diagnosed with is of capsulitis started after her hand surgery.  Last week she had right shoulder intra-articular injection which has almost completely resolved her pain.  And she has had tremendous improvement in the range of motion of her shoulders.  She denies any new neurological or constitutional symptoms.  Recommend continuation of home exercises.  Follow-up as needed.            Sánchez Jean MD  "

## 2024-08-05 DIAGNOSIS — Z12.31 ENCOUNTER FOR SCREENING MAMMOGRAM FOR MALIGNANT NEOPLASM OF BREAST: Primary | ICD-10-CM

## 2024-09-18 ENCOUNTER — APPOINTMENT (OUTPATIENT)
Dept: PRIMARY CARE | Facility: CLINIC | Age: 64
End: 2024-09-18
Payer: COMMERCIAL

## 2024-09-25 ENCOUNTER — APPOINTMENT (OUTPATIENT)
Dept: PRIMARY CARE | Facility: CLINIC | Age: 64
End: 2024-09-25
Payer: COMMERCIAL

## 2025-02-07 ASSESSMENT — PROMIS GLOBAL HEALTH SCALE
CARRYOUT_SOCIAL_ACTIVITIES: FAIR
EMOTIONAL_PROBLEMS: RARELY
CARRYOUT_PHYSICAL_ACTIVITIES: MOSTLY
RATE_AVERAGE_FATIGUE: MODERATE
RATE_MENTAL_HEALTH: GOOD
RATE_SOCIAL_SATISFACTION: GOOD
RATE_GENERAL_HEALTH: POOR
RATE_QUALITY_OF_LIFE: FAIR
RATE_PHYSICAL_HEALTH: POOR
RATE_AVERAGE_PAIN: 1

## 2025-02-11 ENCOUNTER — APPOINTMENT (OUTPATIENT)
Dept: PRIMARY CARE | Facility: CLINIC | Age: 65
End: 2025-02-11
Payer: COMMERCIAL

## 2025-02-11 VITALS
WEIGHT: 133 LBS | HEIGHT: 69 IN | DIASTOLIC BLOOD PRESSURE: 64 MMHG | BODY MASS INDEX: 19.7 KG/M2 | HEART RATE: 103 BPM | SYSTOLIC BLOOD PRESSURE: 130 MMHG

## 2025-02-11 DIAGNOSIS — G20.A1 PARKINSON'S DISEASE WITHOUT DYSKINESIA, UNSPECIFIED WHETHER MANIFESTATIONS FLUCTUATE: ICD-10-CM

## 2025-02-11 DIAGNOSIS — R49.0 DYSPHONIA: Primary | ICD-10-CM

## 2025-02-11 PROCEDURE — 3008F BODY MASS INDEX DOCD: CPT | Performed by: FAMILY MEDICINE

## 2025-02-11 PROCEDURE — 99214 OFFICE O/P EST MOD 30 MIN: CPT | Performed by: FAMILY MEDICINE

## 2025-02-11 ASSESSMENT — ENCOUNTER SYMPTOMS
FEVER: 0
ACTIVITY CHANGE: 0
TREMORS: 1
DIZZINESS: 0
HEADACHES: 0
SHORTNESS OF BREATH: 0
FATIGUE: 0
WEAKNESS: 1

## 2025-02-11 ASSESSMENT — PATIENT HEALTH QUESTIONNAIRE - PHQ9
SUM OF ALL RESPONSES TO PHQ9 QUESTIONS 1 AND 2: 0
2. FEELING DOWN, DEPRESSED OR HOPELESS: NOT AT ALL
1. LITTLE INTEREST OR PLEASURE IN DOING THINGS: NOT AT ALL

## 2025-02-11 NOTE — PROGRESS NOTES
"Subjective   Patient ID: Beverly Sanon is a 65 y.o. female who presents for Annual Exam.    Parkinsons   - patient has a historical diagnosis of parkinsons disease   - reports she was diagnosed via neurology in 2022   - has been taking carbidopa-levidopa without significant improvement in her symptoms at all   - does suffer from some baseline tremors   - reports she has been having some issues with swallowing, and problems with her voice   - symptoms have been gradually progressing over the last few weeks   - symptoms do not evolve or change overnight, she does not notice an improvement in symptoms in the morning   - has not noticed any double vision or blurry vision   - does report some generalized and systemic weakness   - weakness is constant, doesn't change or evolve, and does not improve overnight   - struggles to be active due to the persistent weakness   - did stop her carbidopa and levidopa for a short time a few weeks ago, and had improvement in some of her symptoms after stopping the medication   - per patients own research and discussions with family she is concerned that she has myasthenia gravis, but this has not previously been tested or confirmed            Review of Systems   Constitutional:  Negative for activity change, fatigue and fever.   Respiratory:  Negative for shortness of breath.    Cardiovascular:  Negative for chest pain.   Neurological:  Positive for tremors and weakness. Negative for dizziness and headaches.       Objective   /64   Pulse 103   Ht 1.74 m (5' 8.5\")   Wt 60.3 kg (133 lb)   BMI 19.93 kg/m²     Physical Exam  Constitutional:       Appearance: Normal appearance.   Cardiovascular:      Rate and Rhythm: Normal rate and regular rhythm.   Pulmonary:      Effort: Pulmonary effort is normal.      Breath sounds: Normal breath sounds.   Neurological:      Mental Status: She is alert.   Psychiatric:      Comments: Flat affect         Assessment/Plan   Problem List Items " Addressed This Visit             ICD-10-CM    Parkinsons disease (Multi) G20.A1     Stable   - referral placed to neurology   - patient desires second opinion   - f/u with specialist          Relevant Orders    Referral to Neurology     Other Visit Diagnoses         Codes    Dysphonia    -  Primary R49.0    stable  - referral to neurology for second opinion   - AchR testing due to concerns for MG   - f/u with neurology     Relevant Orders    Acetylcholine Receptor Binding Antibody    Referral to Neurology

## 2025-02-12 ENCOUNTER — PATIENT MESSAGE (OUTPATIENT)
Dept: PRIMARY CARE | Facility: CLINIC | Age: 65
End: 2025-02-12
Payer: COMMERCIAL

## 2025-02-12 DIAGNOSIS — R49.0 DYSPHONIA: Primary | ICD-10-CM

## 2025-02-12 DIAGNOSIS — G20.A1 PARKINSON'S DISEASE WITHOUT DYSKINESIA, UNSPECIFIED WHETHER MANIFESTATIONS FLUCTUATE: ICD-10-CM

## 2025-02-13 LAB
BASOPHILS # BLD AUTO: 19 CELLS/UL (ref 0–200)
BASOPHILS NFR BLD AUTO: 0.5 %
EOSINOPHIL # BLD AUTO: 100 CELLS/UL (ref 15–500)
EOSINOPHIL NFR BLD AUTO: 2.7 %
ERYTHROCYTE [DISTWIDTH] IN BLOOD BY AUTOMATED COUNT: 13.7 % (ref 11–15)
HCT VFR BLD AUTO: 43.2 % (ref 35–45)
HGB BLD-MCNC: 14.2 G/DL (ref 11.7–15.5)
LYMPHOCYTES # BLD AUTO: 1258 CELLS/UL (ref 850–3900)
LYMPHOCYTES NFR BLD AUTO: 34 %
MCH RBC QN AUTO: 28.9 PG (ref 27–33)
MCHC RBC AUTO-ENTMCNC: 32.9 G/DL (ref 32–36)
MCV RBC AUTO: 88 FL (ref 80–100)
MONOCYTES # BLD AUTO: 289 CELLS/UL (ref 200–950)
MONOCYTES NFR BLD AUTO: 7.8 %
NEUTROPHILS # BLD AUTO: 2035 CELLS/UL (ref 1500–7800)
NEUTROPHILS NFR BLD AUTO: 55 %
PLATELET # BLD AUTO: 232 THOUSAND/UL (ref 140–400)
PMV BLD REES-ECKER: 11.2 FL (ref 7.5–12.5)
RBC # BLD AUTO: 4.91 MILLION/UL (ref 3.8–5.1)
WBC # BLD AUTO: 3.7 THOUSAND/UL (ref 3.8–10.8)

## 2025-02-17 LAB — ACHR BIND AB SER-SCNC: <0.3 NMOL/L

## 2025-02-17 NOTE — ASSESSMENT & PLAN NOTE
Stable   - referral placed to neurology   - patient desires second opinion   - f/u with specialist

## 2025-03-20 ENCOUNTER — APPOINTMENT (OUTPATIENT)
Dept: BEHAVIORAL HEALTH | Facility: CLINIC | Age: 65
End: 2025-03-20
Payer: COMMERCIAL

## 2025-03-27 ENCOUNTER — HOSPITAL ENCOUNTER (OUTPATIENT)
Dept: RADIOLOGY | Facility: CLINIC | Age: 65
Discharge: HOME | End: 2025-03-27
Payer: COMMERCIAL

## 2025-03-27 ENCOUNTER — OFFICE VISIT (OUTPATIENT)
Dept: ORTHOPEDIC SURGERY | Facility: CLINIC | Age: 65
End: 2025-03-27
Payer: COMMERCIAL

## 2025-03-27 DIAGNOSIS — M19.049 CMC ARTHRITIS: ICD-10-CM

## 2025-03-27 PROCEDURE — 1036F TOBACCO NON-USER: CPT | Performed by: ORTHOPAEDIC SURGERY

## 2025-03-27 PROCEDURE — 73140 X-RAY EXAM OF FINGER(S): CPT | Mod: RT

## 2025-03-27 PROCEDURE — 99214 OFFICE O/P EST MOD 30 MIN: CPT | Performed by: ORTHOPAEDIC SURGERY

## 2025-03-27 NOTE — PROGRESS NOTES
History present illness: Presents today for general dysfunction of the right hand.  History of Parkinson's disease progressively worsening.  Status post right thumb CMC arthroplasty.  Some mild pain at site of the CMC arthroplasty.      Past medical history: The patient's past medical history, family history, social history, and review of systems were documented on the patient medical intake.  The updated data was reviewed in the electronic medical record.  History is negative except otherwise stated in history of present illness.        Physical examination:  General: Alert and oriented to person, place, and time.  No acute distress and breathing comfortably: Pleasant and cooperative with examination.  HEENT: Head is normocephalic and atraumatic.  Neck: Supple, no visible swelling.  Cardiovascular: No palpable tachycardia  Lungs: No audible wheezing or labored breathing  Abdomen: Nondistended.  Extremities: Evaluation of the right upper extremity finds the patient had palpable radial artery at the wrist with brisk capillary refill to all digits.  Patient has intact sensation to axillary radial median and ulnar nerves.  There are no open wounds.  There are no signs of infection.  There is no evidence of lymphedema or lymphatic streaking.  The patient has supple compartments to right arm forearm and hand.  Spastic postural change of the right hand with components of IP joint flexion contracture.  Flexion posture of the right thumb at MCP and hyperextension posture of the interphalangeal joint.  This is in part passively correctable and when we do so she is able to better flex at the IP joint of the thumb.  No discrete tenderness to right thumb CMC pseudojoint.      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  Complex postural change to right hand secondary to progressive Parkinson's disease.      Plan:  Treatment options were discussed.  She is set to see a new neurologist to discuss ongoing treatment strategies for the Parkinson's that could potentially include more aggressive trial of Botox injection.  Follow-up with me after visit with new neurologist at  with a target for return visit end of June.  No x-rays upon return.  We did discuss the possibility of revision CMC arthroplasty but this is not her main problem and I think it would afford little in the way of functional benefit.  Her primary concern is not pain but of dysfunction to the right hand.        Procedure:

## 2025-04-24 ENCOUNTER — TELEPHONE (OUTPATIENT)
Dept: PRIMARY CARE | Facility: CLINIC | Age: 65
End: 2025-04-24
Payer: COMMERCIAL

## 2025-04-24 NOTE — TELEPHONE ENCOUNTER
Pt lvm stating she saw Dr. Ascencio back in February and he ordered labs. Patient stated had labs done but will need a change in the CPT code so insurance pays for them

## 2025-04-29 ENCOUNTER — TELEPHONE (OUTPATIENT)
Dept: PRIMARY CARE | Facility: CLINIC | Age: 65
End: 2025-04-29
Payer: COMMERCIAL

## 2025-04-29 NOTE — TELEPHONE ENCOUNTER
Pt is asking that the dx code be changed for he antibody test for insurance issue.   Pt states she was only 2 points off and she is still weak .

## 2025-05-07 ENCOUNTER — APPOINTMENT (OUTPATIENT)
Dept: PRIMARY CARE | Facility: CLINIC | Age: 65
End: 2025-05-07
Payer: COMMERCIAL

## 2025-05-12 ENCOUNTER — APPOINTMENT (OUTPATIENT)
Dept: PRIMARY CARE | Facility: CLINIC | Age: 65
End: 2025-05-12
Payer: COMMERCIAL

## 2025-05-12 VITALS
WEIGHT: 126 LBS | TEMPERATURE: 97.4 F | SYSTOLIC BLOOD PRESSURE: 93 MMHG | HEART RATE: 80 BPM | BODY MASS INDEX: 18.88 KG/M2 | DIASTOLIC BLOOD PRESSURE: 60 MMHG

## 2025-05-12 DIAGNOSIS — J31.0 CHRONIC RHINITIS: ICD-10-CM

## 2025-05-12 DIAGNOSIS — E78.5 HYPERLIPIDEMIA, UNSPECIFIED HYPERLIPIDEMIA TYPE: ICD-10-CM

## 2025-05-12 DIAGNOSIS — R63.4 LOSS OF WEIGHT: ICD-10-CM

## 2025-05-12 DIAGNOSIS — Z12.31 ENCOUNTER FOR SCREENING MAMMOGRAM FOR MALIGNANT NEOPLASM OF BREAST: ICD-10-CM

## 2025-05-12 DIAGNOSIS — J30.9 ALLERGIC RHINITIS, UNSPECIFIED SEASONALITY, UNSPECIFIED TRIGGER: Primary | ICD-10-CM

## 2025-05-12 DIAGNOSIS — G20.A1 PARKINSON'S DISEASE WITHOUT DYSKINESIA, UNSPECIFIED WHETHER MANIFESTATIONS FLUCTUATE: ICD-10-CM

## 2025-05-12 PROBLEM — R49.0 DYSPHONIA: Status: ACTIVE | Noted: 2024-06-25

## 2025-05-12 PROBLEM — R13.11 ORAL PHASE DYSPHAGIA: Status: ACTIVE | Noted: 2024-06-25

## 2025-05-12 PROBLEM — M18.11 ARTHRITIS OF CARPOMETACARPAL (CMC) JOINT OF RIGHT THUMB: Status: ACTIVE | Noted: 2024-05-24

## 2025-05-12 PROCEDURE — 99214 OFFICE O/P EST MOD 30 MIN: CPT | Performed by: INTERNAL MEDICINE

## 2025-05-12 PROCEDURE — 1036F TOBACCO NON-USER: CPT | Performed by: INTERNAL MEDICINE

## 2025-05-12 RX ORDER — FLUTICASONE PROPIONATE 50 MCG
1 SPRAY, SUSPENSION (ML) NASAL DAILY
Qty: 16 G | Refills: 3 | Status: SHIPPED | OUTPATIENT
Start: 2025-05-12

## 2025-05-12 NOTE — PROGRESS NOTES
Assessment/Plan   Problem List Items Addressed This Visit       Loss of weight    Hyperlipidemia    Parkinsons disease (Multi)    Chronic rhinitis     Other Visit Diagnoses         Allergic rhinitis, unspecified seasonality, unspecified trigger    -  Primary    Relevant Medications    fluticasone (Flonase) 50 mcg/actuation nasal spray      Encounter for screening mammogram for malignant neoplasm of breast        Relevant Orders    BI mammo bilateral screening tomosynthesis        Parkinson's disease follow-up with neurologist  Continue current medication  Occupational Therapy as she has been seen planning to see  Hyperlipidemia  not on medication  Allergic rhinitis with chronic rhinitis advised to try Flonase nasal spray  Preventative care discussed  Weight loss and affect on the muscles and joints she is going to see occupational therapy for further care    Subjective   Patient ID: Beverly Sanon is a 65 y.o. female who presents for Follow-up and Sinusitis.    Surgical History[1]   Family History[2]   Social History     Socioeconomic History    Marital status:      Spouse name: Not on file    Number of children: Not on file    Years of education: Not on file    Highest education level: Not on file   Occupational History    Not on file   Tobacco Use    Smoking status: Former     Current packs/day: 0.00     Average packs/day: 0.3 packs/day for 1.1 years (0.3 ttl pk-yrs)     Types: Cigarettes     Start date: 1/29/2009     Quit date: 1/29/2010     Years since quitting: 15.2    Smokeless tobacco: Never   Vaping Use    Vaping status: Never Used   Substance and Sexual Activity    Alcohol use: Yes     Comment: Occasionally    Drug use: Never    Sexual activity: Never   Other Topics Concern    Not on file   Social History Narrative    Not on file     Social Drivers of Health     Financial Resource Strain: Patient Declined (4/3/2025)    Received from [facility]    Overall Financial Resource Strain (CARDIA)      Difficulty of Paying Living Expenses: Patient declined   Food Insecurity: Patient Declined (4/3/2025)    Received from [facility]    Hunger Vital Sign     Worried About Running Out of Food in the Last Year: Patient declined     Ran Out of Food in the Last Year: Patient declined   Transportation Needs: Patient Declined (4/3/2025)    Received from [facility]    PRAPARE - Transportation     Lack of Transportation (Medical): Patient declined     Lack of Transportation (Non-Medical): Patient declined   Physical Activity: Sufficiently Active (4/3/2025)    Received from [facility]    Exercise Vital Sign     Days of Exercise per Week: 4 days     Minutes of Exercise per Session: 60 min   Stress: Stress Concern Present (4/3/2025)    Received from [facility]    [employer] [place] [employer] - Occupational Stress Questionnaire     Feeling of Stress : To some extent   Social Connections: Moderately Integrated (4/3/2025)    Received from [facility]    Social Connection and Isolation Panel [NHANES]     Frequency of Communication with Friends and Family: More than three times a week     Frequency of Social Gatherings with Friends and Family: Twice a week     Attends Restorationism Services: 1 to 4 times per year     Active Member of Clubs or Organizations: Yes     Attends Club or Organization Meetings: More than 4 times per year     Marital Status:    Intimate Partner Violence: Patient Declined (4/24/2024)    Received from [facility]    Humiliation, Afraid, Rape, and Kick questionnaire     Fear of Current or Ex-Partner: Patient declined     Emotionally Abused: Patient declined     Physically Abused: Patient declined     Sexually Abused: Patient declined   Housing Stability: Unknown (4/3/2025)    Received from [facility]    Housing Stability Vital Sign     Unable to Pay for Housing in the Last Year: No     Number of Times Moved in the Last Year: Not on file     Homeless in the Last  Year: Not on file      Chlorhexidine and Red yeast rice   Current Rx[3]   Vitals:    05/12/25 1012   BP: 93/60   BP Location: Left arm   Patient Position: Sitting   Pulse: 80   Temp: 36.3 °C (97.4 °F)   TempSrc: Skin   Weight: 57.2 kg (126 lb)      Problem List Items Addressed This Visit       Loss of weight    Hyperlipidemia    Parkinsons disease (Multi)    Chronic rhinitis     Other Visit Diagnoses         Allergic rhinitis, unspecified seasonality, unspecified trigger    -  Primary    Relevant Medications    fluticasone (Flonase) 50 mcg/actuation nasal spray      Encounter for screening mammogram for malignant neoplasm of breast        Relevant Orders    BI mammo bilateral screening tomosynthesis           Orders Placed This Encounter   Procedures    BI mammo bilateral screening tomosynthesis     Standing Status:   Future     Expected Date:   8/12/2025     Expiration Date:   7/12/2026     Perform a breast ultrasound if clinically indicated by Radiologist?:   Yes     Reason for exam::   Breast Cancer Screening     Radiologist to Determine Optimal Study:   Yes     Release result to [facility]:   Immediate     Is this exam part of a Research Study? If Yes, link this order to the research study:   No        HPI  Came for follow-up  Concerned about runny nose all the time has been using saline nasal spray every day  She has been going to occupational therapy are planning to go to occupational therapy for her problems associated loss of muscles in effect and function pains  She has had a history of TIA no more but has aneurysm inside on the MRI findings  I reviewed the neurologist note in relation to Parkinson disease and movement disorder medication    ROS as above  Losing weight  Having loss of function in the hands due to Parkinson's disease and also loss of muscles  Past medical history reviewed  Social and family history reviewed  Allergies and medications reviewed  Recent labs reviewed  Vital signs  "reviewed    PHYSICAL EXAM  Frail  Loss of muscles  Loss of function of the hand  Cogwheel rigidity  Awake and alert    No results found for: \"PR1\", \"BMPR1A\", \"CMPLAS\", \"WS0KYCRI\", \"KPSAT\"   Lab Results   Component Value Date    CHOL 264 (H) 07/18/2023    CHHDL 2.9 07/18/2023     Lab Results   Component Value Date    TSH 1.50 07/18/2023                                          [1]   Past Surgical History:  Procedure Laterality Date    APPENDECTOMY  03/24/2014    Appendectomy    EPIDURAL BLOCK INJECTION  1983    FRACTURE SURGERY  1973    HAND SURGERY Right 05/2023    ORTHOPEDIC SURGERY  2023    OTHER SURGICAL HISTORY  11/16/2021    Colonoscopy    OTHER SURGICAL HISTORY  11/16/2021    Rectal Surgery    TUBAL LIGATION  03/24/2014    Tubal Ligation    WISDOM TOOTH EXTRACTION  1977   [2]   Family History  Problem Relation Name Age of Onset    Atrial fibrillation Mother Dian     Dementia Mother Dian     Breast cancer Mother Dian     Osteoporosis Mother Dian     Other (cardiac disorder) Father      Hyperlipidemia Father      Other (PTCA) Father      Skin cancer Brother      Heart attack Paternal Grandfather     [3]   Current Outpatient Medications   Medication Sig Dispense Refill    acetaminophen (TYLENOL ORAL) Take 1-2 tablets by mouth every 6 hours if needed.      carbidopa-levodopa (Parcopa)  mg disintegrating tablet Take 1 tablet by mouth 4 times a day.      magnesium oxide 500 mg capsule Take 1 capsule (500 mg) by mouth once daily.      montelukast (Singulair) 10 mg tablet Take 1 tablet (10 mg) by mouth once daily.      sodium,potassium,mag sulfates (Suprep) 17.5-3.13-1.6 gram recon soln solution Take by mouth. Use as directed      fluticasone (Flonase) 50 mcg/actuation nasal spray Administer 1 spray into each nostril once daily. Shake gently. Before first use, prime pump. After use, clean tip and replace cap. 16 g 3     Current Facility-Administered Medications   Medication Dose Route Frequency " Provider Last Rate Last Admin    triamcinolone acetonide (Kenalog) injection 5 mg  5 mg intra-articular Once Maurilio Singh DO

## 2025-06-02 ENCOUNTER — APPOINTMENT (OUTPATIENT)
Facility: CLINIC | Age: 65
End: 2025-06-02
Payer: COMMERCIAL

## 2025-06-02 VITALS — BODY MASS INDEX: 20.25 KG/M2 | HEIGHT: 66 IN | WEIGHT: 126 LBS

## 2025-06-02 DIAGNOSIS — J34.89 NASAL CONGESTION WITH RHINORRHEA: Primary | ICD-10-CM

## 2025-06-02 DIAGNOSIS — R09.81 NASAL CONGESTION WITH RHINORRHEA: Primary | ICD-10-CM

## 2025-06-02 DIAGNOSIS — J31.0 CHRONIC RHINITIS: ICD-10-CM

## 2025-06-02 PROCEDURE — 1036F TOBACCO NON-USER: CPT | Performed by: NURSE PRACTITIONER

## 2025-06-02 PROCEDURE — 99203 OFFICE O/P NEW LOW 30 MIN: CPT | Performed by: NURSE PRACTITIONER

## 2025-06-02 PROCEDURE — 3008F BODY MASS INDEX DOCD: CPT | Performed by: NURSE PRACTITIONER

## 2025-06-02 RX ORDER — IPRATROPIUM BROMIDE 42 UG/1
2 SPRAY, METERED NASAL 4 TIMES DAILY
Qty: 30 ML | Refills: 11 | Status: SHIPPED | OUTPATIENT
Start: 2025-06-02

## 2025-06-02 ASSESSMENT — PATIENT HEALTH QUESTIONNAIRE - PHQ9
1. LITTLE INTEREST OR PLEASURE IN DOING THINGS: NOT AT ALL
SUM OF ALL RESPONSES TO PHQ9 QUESTIONS 1 AND 2: 0
2. FEELING DOWN, DEPRESSED OR HOPELESS: NOT AT ALL

## 2025-06-02 ASSESSMENT — PAIN SCALES - GENERAL: PAINLEVEL_OUTOF10: 0-NO PAIN

## 2025-06-02 NOTE — PATIENT INSTRUCTIONS
COCONUT OIL: purchase coconut oil and apply to bilateral nostrils 2-3 times per day for nasal dryness. See example pictured below, may purchase any available brand.

## 2025-06-02 NOTE — PROGRESS NOTES
"Chief Complaint:   Chief Complaint   Patient presents with    New Patient Visit          HISTORY OF PRESENT ILLNESS:  Beverly Sanon is a 65 y.o. female who was referred by  self referred with complaints of chronic rhinitis.  History of Present Illness  Beverly Sanon is a 65 year old female with Parkinson's disease who presents with a persistent runny nose. She was referred by Dr. Maciel for evaluation of her persistent runny nose.    She has a persistent runny nose with clear, watery nasal discharge occurring throughout the day. Despite using a sinus rinse once daily for two years, which initially provided morning relief, her symptoms have progressively worsened.    Three weeks ago, she started using Flonase, two sprays in each nostril once daily, as prescribed. However, there has been no improvement in her symptoms, and she is uncertain about the correct usage. No recurrent sinus infections, anosmia, or epistaxis. The runny nose is particularly inconvenient while driving, as it drips and she prefers not to take her hands off the wheel.    Her past medical history includes Parkinson's disease, which she believes may contribute to her nasal symptoms. She recalls a past incident in her early thirties involving an irritant in her nose, initially thought to be a wart, removed by a maxillofacial surgeon and former ENT specialist.    Family history reveals her father had significant sinus issues in the 1960s. She also reports increased earwax production.         EXAMINATION:  Ht 1.676 m (5' 6\")   Wt 57.2 kg (126 lb)   BMI 20.34 kg/m²   General Appearance: Normal appearance and development with age appropriate communication. Cooperative and oriented to place, time and location.  Eyes: Symmetric, sclera white, pupils are equal, round and reactive.  Head and Face: Skin over the face is normal with no scars, lesions, or rashes. [No] tenderness to palpation of face and sinuses.  Neck: Symmetrical, trachea midline, no " lymphadenopathy, Thyroid is symmetrical, no enlargement, tenderness, or nodules.  Airway: No strider, no stutter. Voice is clear and normal.   Neuro: Normal affect, cranial nerves are grossly intact   Skin: Warm and dry, no rashes or lesions  Bilateral Ears: Pinna is normal without scars or lesions.  Nose: External appearance is normal. Nasal mucosa pink and moist without inflammation.  Nasal septum deviated to the right with mild dryness. No ulceration, crusting, or lesions. Nasal tissue appears normal.  Oral Cavity/Oropharynx: Lips are normal without lesions. Dentition intact, normal appearing. Oral mucosa is pink and moist without lesions. Tongue is freely mobile and normal.      ASSESSMENT/ PLAN:  Assessment & Plan  Rhinorrhea, Nasal septal deviation  Chronic rhinorrhea likely due atrophic rhinitis. Flonase ineffective.  - Discontinue Flonase.  - Prescribe ipratropium nasal spray, use up to three times daily as needed.  - Continue saline irrigation daily.  - Apply coconut oil in nostrils at night for moisturizing.  - Follow up in 4-6 weeks to assess response.           Review of Systems Documented by MA and reviewed today: Yes  Medical, Surgical, Medication, Allergy, Family, Social History Reviewed: Yes    Medical History[1]  Surgical History[2]  Current Medications[3]  Allergies[4]   Family History[5]     I discussed with the patient the complexity of my medical decision making including the treatment plan and testing rational. All questions were answered to the patient's satisfaction and female  verbalized understanding and agreement with theplan of care. The patient was encouraged to call my office should any additional ENT related questions or concerns arise.  Office number: [phone]     Printed copy of detailed patient instructions provided to patient at checkout upon request otherwise available electronically for reference via patients MyChart.     **This electronic medical record note was created with  the use of voice recognition software. Despite proofreading, typographical or grammatical errors may be present that could affect the meaning of content. Please call with any questions.**         [1]   Past Medical History:  Diagnosis Date    Allergic After 50    Arthritis 10/1/20    Encounter for supervision of normal pregnancy, unspecified, unspecified trimester     Number of pregnancies, currently pregnant    Frozen shoulder 5/2023    Joint pain 2022    Lumbosacral disc disease 11/2008    Osteoarthritis 2023    Personal history of pneumonia (recurrent)     History of pneumonia   [2]   Past Surgical History:  Procedure Laterality Date    APPENDECTOMY  03/24/2014    Appendectomy    EPIDURAL BLOCK INJECTION  1983    FRACTURE SURGERY  1973    HAND SURGERY Right 05/2023    ORTHOPEDIC SURGERY  2023    OTHER SURGICAL HISTORY  11/16/2021    Colonoscopy    OTHER SURGICAL HISTORY  11/16/2021    Rectal Surgery    TUBAL LIGATION  03/24/2014    Tubal Ligation    WISDOM TOOTH EXTRACTION  1977   [3]   Current Outpatient Medications:     acetaminophen (TYLENOL ORAL), Take 1-2 tablets by mouth every 6 hours if needed., Disp: , Rfl:     carbidopa-levodopa (Parcopa)  mg disintegrating tablet, Take 1 tablet by mouth 4 times a day., Disp: , Rfl:     fluticasone (Flonase) 50 mcg/actuation nasal spray, Administer 1 spray into each nostril once daily. Shake gently. Before first use, prime pump. After use, clean tip and replace cap., Disp: 16 g, Rfl: 3    magnesium oxide 500 mg capsule, Take 1 capsule (500 mg) by mouth once daily., Disp: , Rfl:     montelukast (Singulair) 10 mg tablet, Take 1 tablet (10 mg) by mouth once daily., Disp: , Rfl:     sodium,potassium,mag sulfates (Suprep) 17.5-3.13-1.6 gram recon soln solution, Take by mouth. Use as directed, Disp: , Rfl:     Current Facility-Administered Medications:     triamcinolone acetonide (Kenalog) injection 5 mg, 5 mg, intra-articular, Once, Maurilio Singh DO  [4]    Allergies  Allergen Reactions    Chlorhexidine Rash    Red Yeast Rice Swelling, Hives and Itching   [5]   Family History  Problem Relation Name Age of Onset    Atrial fibrillation Mother Dian     Dementia Mother Dian     Breast cancer Mother Dian     Osteoporosis Mother Dian     Other (cardiac disorder) Father      Hyperlipidemia Father      Other (PTCA) Father      Skin cancer Brother      Heart attack Paternal Grandfather

## 2025-06-24 ENCOUNTER — APPOINTMENT (OUTPATIENT)
Dept: ORTHOPEDIC SURGERY | Facility: CLINIC | Age: 65
End: 2025-06-24
Payer: COMMERCIAL

## 2025-07-07 ENCOUNTER — APPOINTMENT (OUTPATIENT)
Facility: CLINIC | Age: 65
End: 2025-07-07
Payer: COMMERCIAL

## 2025-07-14 ENCOUNTER — APPOINTMENT (OUTPATIENT)
Facility: CLINIC | Age: 65
End: 2025-07-14
Payer: COMMERCIAL

## 2025-08-12 ENCOUNTER — APPOINTMENT (OUTPATIENT)
Dept: RADIOLOGY | Facility: CLINIC | Age: 65
End: 2025-08-12
Payer: COMMERCIAL

## 2025-08-12 VITALS — HEIGHT: 66 IN | BODY MASS INDEX: 20.25 KG/M2 | WEIGHT: 126 LBS

## 2025-08-12 DIAGNOSIS — Z12.31 ENCOUNTER FOR SCREENING MAMMOGRAM FOR MALIGNANT NEOPLASM OF BREAST: ICD-10-CM

## 2025-08-12 PROCEDURE — 77067 SCR MAMMO BI INCL CAD: CPT | Performed by: RADIOLOGY

## 2025-08-12 PROCEDURE — 77067 SCR MAMMO BI INCL CAD: CPT

## 2025-08-12 PROCEDURE — 77063 BREAST TOMOSYNTHESIS BI: CPT | Performed by: RADIOLOGY

## 2025-08-20 ENCOUNTER — APPOINTMENT (OUTPATIENT)
Dept: NEUROLOGY | Facility: CLINIC | Age: 65
End: 2025-08-20
Payer: COMMERCIAL

## 2025-08-26 ENCOUNTER — APPOINTMENT (OUTPATIENT)
Dept: ORTHOPEDIC SURGERY | Facility: CLINIC | Age: 65
End: 2025-08-26
Payer: COMMERCIAL

## 2025-11-11 ENCOUNTER — APPOINTMENT (OUTPATIENT)
Dept: PRIMARY CARE | Facility: CLINIC | Age: 65
End: 2025-11-11
Payer: COMMERCIAL